# Patient Record
Sex: MALE | Race: WHITE | Employment: UNEMPLOYED | URBAN - METROPOLITAN AREA
[De-identification: names, ages, dates, MRNs, and addresses within clinical notes are randomized per-mention and may not be internally consistent; named-entity substitution may affect disease eponyms.]

---

## 2017-07-31 ENCOUNTER — HOSPITAL ENCOUNTER (OUTPATIENT)
Age: 82
Setting detail: SPECIMEN
Discharge: HOME | End: 2017-07-31
Payer: MEDICARE

## 2017-08-02 LAB — DERMATOLOGY PATHOLOGY REPORT: NORMAL

## 2017-10-01 ENCOUNTER — APPOINTMENT (OUTPATIENT)
Dept: CT IMAGING | Age: 82
End: 2017-10-01
Payer: MEDICARE

## 2017-10-01 ENCOUNTER — HOSPITAL ENCOUNTER (OUTPATIENT)
Age: 82
Setting detail: OBSERVATION
Discharge: HOME | End: 2017-10-04
Attending: EMERGENCY MEDICINE | Admitting: INTERNAL MEDICINE
Payer: MEDICARE

## 2017-10-01 ENCOUNTER — APPOINTMENT (OUTPATIENT)
Dept: GENERAL RADIOLOGY | Age: 82
End: 2017-10-01
Payer: MEDICARE

## 2017-10-01 DIAGNOSIS — N30.00 ACUTE CYSTITIS WITHOUT HEMATURIA: Primary | ICD-10-CM

## 2017-10-01 LAB
-: ABNORMAL
ABSOLUTE EOS #: 0.1 K/UL (ref 0–0.4)
ABSOLUTE LYMPH #: 0.7 K/UL (ref 1–4.8)
ABSOLUTE MONO #: 0.5 K/UL (ref 0.2–0.8)
ALBUMIN SERPL-MCNC: 3.6 G/DL (ref 3.5–5.2)
ALBUMIN/GLOBULIN RATIO: ABNORMAL (ref 1–2.5)
ALP BLD-CCNC: 230 U/L (ref 40–129)
ALT SERPL-CCNC: 57 U/L (ref 5–41)
AMORPHOUS: ABNORMAL
AMYLASE: 52 U/L (ref 28–100)
ANION GAP SERPL CALCULATED.3IONS-SCNC: 16 MMOL/L (ref 9–17)
AST SERPL-CCNC: 142 U/L
BACTERIA: ABNORMAL
BASOPHILS # BLD: 1 %
BASOPHILS ABSOLUTE: 0.1 K/UL (ref 0–0.2)
BILIRUB SERPL-MCNC: 1.41 MG/DL (ref 0.3–1.2)
BILIRUBIN DIRECT: 0.93 MG/DL
BILIRUBIN URINE: NEGATIVE
BILIRUBIN, INDIRECT: 0.48 MG/DL (ref 0–1)
BUN BLDV-MCNC: 21 MG/DL (ref 8–23)
BUN/CREAT BLD: 17 (ref 9–20)
CALCIUM SERPL-MCNC: 9.6 MG/DL (ref 8.6–10.4)
CASTS UA: ABNORMAL /LPF
CHLORIDE BLD-SCNC: 93 MMOL/L (ref 98–107)
CO2: 26 MMOL/L (ref 20–31)
COLOR: YELLOW
COMMENT UA: ABNORMAL
CREAT SERPL-MCNC: 1.21 MG/DL (ref 0.7–1.2)
CRYSTALS, UA: ABNORMAL /HPF
DIFFERENTIAL TYPE: ABNORMAL
EOSINOPHILS RELATIVE PERCENT: 1 %
EPITHELIAL CELLS UA: ABNORMAL /HPF
GFR AFRICAN AMERICAN: >60 ML/MIN
GFR NON-AFRICAN AMERICAN: 57 ML/MIN
GFR SERPL CREATININE-BSD FRML MDRD: ABNORMAL ML/MIN/{1.73_M2}
GFR SERPL CREATININE-BSD FRML MDRD: ABNORMAL ML/MIN/{1.73_M2}
GLOBULIN: ABNORMAL G/DL (ref 1.5–3.8)
GLUCOSE BLD-MCNC: 141 MG/DL (ref 70–99)
GLUCOSE URINE: NEGATIVE
HCT VFR BLD CALC: 37.2 % (ref 41–53)
HEMOGLOBIN: 12.2 G/DL (ref 13.5–17.5)
KETONES, URINE: NEGATIVE
LEUKOCYTE ESTERASE, URINE: ABNORMAL
LIPASE: 27 U/L (ref 13–60)
LYMPHOCYTES # BLD: 8 %
MCH RBC QN AUTO: 26 PG (ref 26–34)
MCHC RBC AUTO-ENTMCNC: 32.8 G/DL (ref 31–37)
MCV RBC AUTO: 79.5 FL (ref 80–100)
MONOCYTES # BLD: 6 %
MUCUS: ABNORMAL
MYOGLOBIN: 28 NG/ML (ref 28–72)
NITRITE, URINE: NEGATIVE
OTHER OBSERVATIONS UA: ABNORMAL
PDW BLD-RTO: 15.5 % (ref 11.5–14.5)
PH UA: 7.5 (ref 5–8)
PLATELET # BLD: 222 K/UL (ref 130–400)
PLATELET ESTIMATE: ABNORMAL
PMV BLD AUTO: ABNORMAL FL (ref 6–12)
POTASSIUM SERPL-SCNC: 4.2 MMOL/L (ref 3.7–5.3)
PROTEIN UA: NEGATIVE
RBC # BLD: 4.68 M/UL (ref 4.5–5.9)
RBC # BLD: ABNORMAL 10*6/UL
RBC UA: ABNORMAL /HPF (ref 0–2)
RENAL EPITHELIAL, UA: ABNORMAL /HPF
SEG NEUTROPHILS: 84 %
SEGMENTED NEUTROPHILS ABSOLUTE COUNT: 7.2 K/UL (ref 1.8–7.7)
SODIUM BLD-SCNC: 135 MMOL/L (ref 135–144)
SPECIFIC GRAVITY UA: 1.01 (ref 1–1.03)
TOTAL PROTEIN: 7.9 G/DL (ref 6.4–8.3)
TRICHOMONAS: ABNORMAL
TROPONIN INTERP: NORMAL
TROPONIN T: <0.03 NG/ML
TURBIDITY: ABNORMAL
URINE HGB: ABNORMAL
UROBILINOGEN, URINE: ABNORMAL
WBC # BLD: 8.6 K/UL (ref 3.5–11)
WBC # BLD: ABNORMAL 10*3/UL
WBC UA: ABNORMAL /HPF (ref 0–5)
YEAST: ABNORMAL

## 2017-10-01 PROCEDURE — 87186 SC STD MICRODIL/AGAR DIL: CPT

## 2017-10-01 PROCEDURE — 87086 URINE CULTURE/COLONY COUNT: CPT

## 2017-10-01 PROCEDURE — 81001 URINALYSIS AUTO W/SCOPE: CPT

## 2017-10-01 PROCEDURE — 80076 HEPATIC FUNCTION PANEL: CPT

## 2017-10-01 PROCEDURE — 85025 COMPLETE CBC W/AUTO DIFF WBC: CPT

## 2017-10-01 PROCEDURE — 82150 ASSAY OF AMYLASE: CPT

## 2017-10-01 PROCEDURE — 6360000002 HC RX W HCPCS: Performed by: NURSE PRACTITIONER

## 2017-10-01 PROCEDURE — 83690 ASSAY OF LIPASE: CPT

## 2017-10-01 PROCEDURE — G0378 HOSPITAL OBSERVATION PER HR: HCPCS

## 2017-10-01 PROCEDURE — 74177 CT ABD & PELVIS W/CONTRAST: CPT

## 2017-10-01 PROCEDURE — 6370000000 HC RX 637 (ALT 250 FOR IP): Performed by: INTERNAL MEDICINE

## 2017-10-01 PROCEDURE — 96365 THER/PROPH/DIAG IV INF INIT: CPT

## 2017-10-01 PROCEDURE — 87077 CULTURE AEROBIC IDENTIFY: CPT

## 2017-10-01 PROCEDURE — 93005 ELECTROCARDIOGRAM TRACING: CPT

## 2017-10-01 PROCEDURE — 74022 RADEX COMPL AQT ABD SERIES: CPT

## 2017-10-01 PROCEDURE — 2580000003 HC RX 258: Performed by: NURSE PRACTITIONER

## 2017-10-01 PROCEDURE — 80048 BASIC METABOLIC PNL TOTAL CA: CPT

## 2017-10-01 PROCEDURE — 2580000003 HC RX 258: Performed by: INTERNAL MEDICINE

## 2017-10-01 PROCEDURE — 99285 EMERGENCY DEPT VISIT HI MDM: CPT

## 2017-10-01 PROCEDURE — 83874 ASSAY OF MYOGLOBIN: CPT

## 2017-10-01 PROCEDURE — 6360000004 HC RX CONTRAST MEDICATION: Performed by: NURSE PRACTITIONER

## 2017-10-01 PROCEDURE — 84484 ASSAY OF TROPONIN QUANT: CPT

## 2017-10-01 PROCEDURE — 96361 HYDRATE IV INFUSION ADD-ON: CPT

## 2017-10-01 RX ORDER — SODIUM CHLORIDE 0.9 % (FLUSH) 0.9 %
10 SYRINGE (ML) INJECTION PRN
Status: DISCONTINUED | OUTPATIENT
Start: 2017-10-01 | End: 2017-10-04 | Stop reason: HOSPADM

## 2017-10-01 RX ORDER — OXYCODONE HYDROCHLORIDE 15 MG/1
15-30 TABLET ORAL
COMMUNITY

## 2017-10-01 RX ORDER — 0.9 % SODIUM CHLORIDE 0.9 %
50 INTRAVENOUS SOLUTION INTRAVENOUS ONCE
Status: COMPLETED | OUTPATIENT
Start: 2017-10-01 | End: 2017-10-01

## 2017-10-01 RX ORDER — SODIUM CHLORIDE 0.9 % (FLUSH) 0.9 %
10 SYRINGE (ML) INJECTION EVERY 12 HOURS SCHEDULED
Status: DISCONTINUED | OUTPATIENT
Start: 2017-10-01 | End: 2017-10-04 | Stop reason: HOSPADM

## 2017-10-01 RX ORDER — OXYCODONE HYDROCHLORIDE 15 MG/1
15 TABLET ORAL EVERY 4 HOURS PRN
Status: DISCONTINUED | OUTPATIENT
Start: 2017-10-01 | End: 2017-10-03

## 2017-10-01 RX ORDER — DOCUSATE SODIUM 100 MG/1
100 CAPSULE, LIQUID FILLED ORAL NIGHTLY
Status: ON HOLD | COMMUNITY
End: 2017-10-04

## 2017-10-01 RX ORDER — METOPROLOL TARTRATE 50 MG/1
50 TABLET, FILM COATED ORAL 2 TIMES DAILY
Status: DISCONTINUED | OUTPATIENT
Start: 2017-10-01 | End: 2017-10-02

## 2017-10-01 RX ADMIN — OXYCODONE HYDROCHLORIDE 15 MG: 15 TABLET ORAL at 21:39

## 2017-10-01 RX ADMIN — IOPAMIDOL 125 ML: 755 INJECTION, SOLUTION INTRAVENOUS at 15:52

## 2017-10-01 RX ADMIN — METOPROLOL TARTRATE 50 MG: 50 TABLET ORAL at 21:39

## 2017-10-01 RX ADMIN — Medication 10 ML: at 15:52

## 2017-10-01 RX ADMIN — CEFTRIAXONE SODIUM 1 G: 1 INJECTION, POWDER, FOR SOLUTION INTRAMUSCULAR; INTRAVENOUS at 16:31

## 2017-10-01 RX ADMIN — SODIUM CHLORIDE 50 ML: 9 INJECTION, SOLUTION INTRAVENOUS at 15:52

## 2017-10-01 RX ADMIN — Medication 10 ML: at 21:45

## 2017-10-01 ASSESSMENT — ENCOUNTER SYMPTOMS
COLOR CHANGE: 0
CONSTIPATION: 0
VOMITING: 0
DIARRHEA: 0
WHEEZING: 0
COUGH: 0
SORE THROAT: 0
RHINORRHEA: 0
ABDOMINAL PAIN: 0
SHORTNESS OF BREATH: 0
SINUS PRESSURE: 0
NAUSEA: 0

## 2017-10-01 ASSESSMENT — PAIN SCALES - GENERAL
PAINLEVEL_OUTOF10: 8
PAINLEVEL_OUTOF10: 0

## 2017-10-01 ASSESSMENT — PAIN DESCRIPTION - LOCATION
LOCATION: ABDOMEN

## 2017-10-01 ASSESSMENT — PAIN DESCRIPTION - DESCRIPTORS
DESCRIPTORS: ACHING

## 2017-10-01 ASSESSMENT — PAIN DESCRIPTION - PAIN TYPE
TYPE: CHRONIC PAIN;ACUTE PAIN
TYPE: ACUTE PAIN
TYPE: ACUTE PAIN

## 2017-10-01 NOTE — IP AVS SNAPSHOT
Patient Information     Patient Name DORENE Maguire 1934         This is your updated medication list to keep with you all times      TAKE these medications     amiodarone 200 MG tablet   Commonly known as:  CORDARONE   Take 1 tablet by mouth daily       aspirin 81 MG tablet       cephALEXin 500 MG capsule   Commonly known as:  KEFLEX   Take 1 capsule by mouth 3 times daily for 10 days       docusate sodium 100 MG capsule   Commonly known as:  COLACE   Take 1 capsule by mouth 2 times daily       furosemide 40 MG tablet   Commonly known as:  LASIX   Take 1 tablet by mouth daily       lisinopril 2.5 MG tablet   Commonly known as:  ZESTRIL   Take 1 tablet by mouth daily       metoprolol tartrate 25 MG tablet   Commonly known as:  LOPRESSOR       oxyCODONE 15 MG immediate release tablet   Commonly known as:  OXY-IR       senna 8.6 MG tablet   Commonly known as:  SENOKOT   Take 1 tablet by mouth 2 times daily       tamsulosin 0.4 MG capsule   Commonly known as:  FLOMAX         ASK your doctor about these medications     clopidogrel 75 MG tablet   Commonly known as:  PLAVIX   Take 1 tablet by mouth daily       pitavastatin 2 MG Tabs tablet   Commonly known as:  LIVALO

## 2017-10-01 NOTE — IP AVS SNAPSHOT
After Visit Summary  (Discharge Instructions)    Medication List for Home    Based on the information you provided to us as well as any changes during this visit, the following is your updated medication list.  Compare this with your prescription bottles at home. If you have any questions or concerns, contact your primary care physician's office. Daily Medication List (This medication list can be shared with any healthcare provider who is helping you manage your medications)      There are NEW medications for you. START taking them after you leave the hospital        Last Dose    Next Dose Due AM NOON PM NIGHT    cephALEXin 500 MG capsule   Commonly known as:  KEFLEX   Take 1 capsule by mouth 3 times daily for 10 days                                         senna 8.6 MG tablet   Commonly known as:  SENOKOT   Take 1 tablet by mouth 2 times daily                                           You told us you were taking these medications at home, but the amount or how often you take this medication has CHANGED        Last Dose    Next Dose Due AM NOON PM NIGHT    amiodarone 200 MG tablet   Commonly known as:  CORDARONE   Take 1 tablet by mouth daily   What changed:    - medication strength  - Another medication with the same name was removed. Continue taking this medication, and follow the directions you see here.                 200 mg on 10/4/2017  8:53 AM                            docusate sodium 100 MG capsule   Commonly known as:  COLACE   Take 1 capsule by mouth 2 times daily   What changed:  when to take this                100 mg on 10/3/2017 10:40 PM                            lisinopril 2.5 MG tablet   Commonly known as:  ZESTRIL   Take 1 tablet by mouth daily   What changed:    - medication strength  - how much to take                                           These are medications you told us you were taking at home, CONTINUE taking them after you leave the hospital        Last Dose Clonidine Derivatives Other (See Comments)    hallucinations    Digoxin And Related     hallucinations      Immunizations as of 10/4/2017     Name Date Dose VIS Date Route    Influenza, Quadv, 3 yrs and older, IM, Preservative Free  Deferred () 0.5 mL 2015 Intramuscular    Comment: pt wants to check with hem/oc first    Pneumococcal 13-valent Conjugate ([id number])  Deferred () 0.5 mL 2015 Intramuscular    Comment: pt wants to check with hem/oc. Last Vitals          Most Recent Value    Temp  98.2 °F (36.8 °C)    Pulse  67    Resp  16    BP  (!)  133/51         After Visit Summary    This summary was created for you. Thank you for entrusting your care to us. The following information includes details about your hospital/visit stay along with steps you should take to help with your recovery once you leave the hospital.  In this packet, you will find information about the topics listed below:    · Instructions about your medications including a list of your home medications  · A summary of your hospital visit  · Follow-up appointments once you have left the hospital  · Your care plan at home      You may receive a survey regarding the care you received during your stay. Your input is valuable to us. We encourage you to complete and return your survey in the envelope provided. We hope you will choose us in the future for your healthcare needs. Patient Information     Patient Name DORENE Spring 1934      Care Provided at:     Name Address Phone       [place] [address] [phone]            Your Visit    Here you will find information about your visit, including the reason for your visit. Please take this sheet with you when you visit your doctor or other health care provider in the future. It will help determine the best possible medical care for you at that time.   If you have any questions the hospital.  Your care team will discuss these with you, so you and those caring for you know how to best care for your health needs at home. This section may also include educational information about certain health topics that may be of help to you. Discharge Instructions       Drink plenty of water  For any problems call your doctor or return to the emergency room  Notify MD if temperature, unable to urinate, bloody urine, confusion or weakness    Diet Instructions     ? Good nutrition is important when healing from an illness, injury, or surgery. Follow any nutrition recommendations given to you during your hospital stay. ? If you were given an oral nutrition supplement while in the hospital, continue to take this supplement at home. You can take it with meals, in-between meals, and/or before bedtime. These supplements can be purchased at most local grocery stores, pharmacies, and [employer]-stores. ? If you have any questions about your diet or nutrition, call the hospital and ask for the dietitian. Regular diet Drink plenty of fluids           Activity Instructions     No activity restrictions Up with cane           Important information for a smoker       SMOKING: QUIT SMOKING. THIS IS THE MOST IMPORTANT ACTION YOU CAN TAKE TO IMPROVE YOUR CURRENT AND FUTURE HEALTH. Call the [address] [place] [address] at [place] NOW ([phone])    Smoking harms nonsmokers. When nonsmokers are around people who smoke, they absorb nicotine, carbon monoxide, and other ingredients of tobacco smoke. DO NOT SMOKE AROUND CHILDREN     Children exposed to secondhand smoke are at an increased risk of:  Sudden Infant Death Syndrome (SIDS), acute respiratory infections, inflammation of the middle ear, and severe asthma. Over a longer time, it causes heart disease and lung cancer. There is no safe level of exposure to secondhand smoke.                 [facility] Bringrr allows you to send messages to your doctor, view your test results, renew your prescriptions, schedule appointments, view visit notes, and more. How Do I Sign Up? 1. In your Internet browser, go to [email or web address]  2. Click on the Sign Up Now link in the Sign In box. You will see the New Member Sign Up page. 3. Enter your Bringrr Access Code exactly as it appears below. You will not need to use this code after youve completed the sign-up process. If you do not sign up before the expiration date, you must request a new code. Bringrr Access Code: [email or web address]  Expires: 12/2/2017  6:23 PM    4. Enter your Social Security Number ([id number]) and Date of Birth (mm/dd/yyyy) as indicated and click Submit. You will be taken to the next sign-up page. 5. Create a Bringrr ID. This will be your Bringrr login ID and cannot be changed, so think of one that is secure and easy to remember. 6. Create a Bringrr password. You can change your password at any time. 7. Enter your Password Reset Question and Answer. This can be used at a later time if you forget your password. 8. Enter your e-mail address. You will receive e-mail notification when new information is available in [address] 9. Click Sign Up. You can now view your medical record. Additional Information  If you have questions, please contact the physician practice where you receive care. Remember, Bringrr is NOT to be used for urgent needs. For medical emergencies, dial 911. For questions regarding your Bringrr account call [phone]. If you have a clinical question, please call your doctor's office. View your information online  ? Review your current list of  medications, immunization, and allergies. ? Review your future test results online . ?  Review your discharge instructions provided by your caregivers at discharge    Certain functionality such as prescription refills, scheduling appointments or sending messages to your provider are not activated if your provider does not use [employer] in his/her office    For questions regarding your [employer] account call [phone]. If you have a clinical question, please call your doctor's office. The information on all pages of the After Visit Summary has been reviewed with me, the patient and/or responsible adult, by my health care provider(s). I had the opportunity to ask questions regarding this information. I understand I should dispose of my armband safely at home to protect my health information. A complete copy of the After Visit Summary has been given to me, the patient and/or responsible adult. Patient Signature/Responsible Adult:____________________    Clinician Signature:_____________________    Date:_____________________    Time:_____________________        More Information           Urinary Tract Infections in Men: Care Instructions  Your Care Instructions    A urinary tract infection, or UTI, is a general term for an infection anywhere between the kidneys and the tip of the penis. UTIs can also be a result of a prostate problem. Most cause pain or burning when you urinate. Most UTIs are caused by bacteria and can be cured with antibiotics. It is important to complete your treatment so that the infection does not get worse. Follow-up care is a key part of your treatment and safety. Be sure to make and go to all appointments, and call your doctor if you are having problems. It's also a good idea to know your test results and keep a list of the medicines you take. How can you care for yourself at home? · Take your antibiotics as prescribed. Do not stop taking them just because you feel better. You need to take the full course of antibiotics. · Take your medicines exactly as prescribed.  Your doctor may have prescribed a medicine, such as phenazopyridine (Pyridium), to help relieve pain when you urinate. This turns your urine orange. You may stop taking it when your symptoms get better. But be sure to take all of your antibiotics, which treat the infection. · Drink extra water for the next day or two. This will help make the urine less concentrated and help wash out the bacteria causing the infection. (If you have kidney, heart, or liver disease and have to limit your fluids, talk with your doctor before you increase your fluid intake.)  · Avoid drinks that are carbonated or have caffeine. They can irritate the bladder. · Urinate often. Try to empty your bladder each time. · To relieve pain, take a hot bath or lay a heating pad (set on low) over your lower belly or genital area. Never go to sleep with a heating pad in place. To help prevent UTIs  · Drink plenty of fluids, enough so that your urine is light yellow or clear like water. If you have kidney, heart, or liver disease and have to limit fluids, talk with your doctor before you increase the amount of fluids you drink. · Urinate when you have the urge. Do not hold your urine for a long time. Urinate before you go to sleep. · Keep your penis clean. Catheter care  If you have a drainage tube (catheter) in place, the following steps will help you care for it. · Always wash your hands before and after touching your catheter. · Check the area around the urethra for inflammation or signs of infection. Signs of infection include irritated, swollen, red, or tender skin, or pus around the catheter. · Clean the area around the catheter with soap and water two times a day. Dry with a clean towel afterward. · Do not apply powder or lotion to the skin around the catheter. To empty the urine collection bag  · Wash your hands with soap and water. · Without touching the drain spout, remove the spout from its sleeve at the bottom of the collection bag. Open the valve on the spout. Other things that may put you at risk for COPD include breathing chemical fumes, dust, or air pollution over a long period of time. Secondhand smoke is also bad. In chronic bronchitis, the airways that carry air to the lungs (bronchial tubes) get inflamed and make a lot of mucus. This can narrow or block the airways, making it hard for you to breathe. In emphysema, the air sacs in your lungs are damaged and lose their stretch. Less air gets in and out of your lungs, which makes you feel short of breath. What can you expect when you have COPD? COPD gets worse over time. You cannot undo the damage to your lungs. Over time, you may find that:  · You get short of breath even when you do simple things like get dressed or fix a meal.  · It is hard to eat or exercise. · You lose weight and feel weaker. But there are things you can do to prevent more damage and feel better. What are the symptoms? The main symptoms are:  · A cough that will not go away. · Mucus that comes up when you cough. · Shortness of breath that gets worse with activity. At times, your symptoms may suddenly flare up and get much worse. This is a called a COPD exacerbation (say \"[facility]\"). When this happens, your usual symptoms quickly get worse and stay bad. This can be dangerous. You may have to go to the hospital.  How can you keep COPD from getting worse? Don't smoke. That is the best way to keep COPD from getting worse. If you already smoke, it is never too late to stop. If you need help quitting, talk to your doctor about stop-smoking programs and medicines. These can increase your chances of quitting for good. You can do other things to keep COPD from getting worse:  · Avoid bad air. Air pollution, chemical fumes, and dust also can make COPD worse. · Get a flu shot every year. A shot may keep the flu from turning into something more serious, like pneumonia.  A flu shot also may lower your · Eat regular, healthy meals. Use bronchodilators about 1 hour before you eat to make it easier to eat. Eat several small meals instead of three large ones. Drink beverages at the end of the meal. Avoid foods that are hard to chew. Follow-up care is a key part of your treatment and safety. Be sure to make and go to all appointments, and call your doctor if you are having problems. It's also a good idea to know your test results and keep a list of the medicines you take. Where can you learn more? Go to [email or web address]://[email or web address] org and sign in to your [employer] account. Enter V314 in the hulu box to learn more about \"Learning About COPD. \"     If you do not have an account, please click on the \"Sign Up Now\" link. Current as of: March 25, 2017  Content Version: 11.3  © 3039-0112 [employer]. Care instructions adapted under license by [facility] ([facility]). If you have questions about a medical condition or this instruction, always ask your healthcare professional. [address] any warranty or liability for your use of this information. Learning About Heart Failure Zones  What are heart failure zones? Heart failure zones give you an easy way to see changes in your heart failure symptoms. They also tell you when you need to get help. Check every day to see which zone you are in. Green zone. You are doing well. This is where you want to be. · Your weight is stable. This means it is not going up or down. · You breathe easily. · You are sleeping well. You are able to lie flat without shortness of breath. · You can do your usual activities. Yellow zone. Be careful. Your symptoms are changing. Call your doctor. · You have new or increased shortness of breath. · You are dizzy or lightheaded, or you feel like you may faint.   · You have sudden weight gain, such as more than 2 to 3 pounds in a day or

## 2017-10-01 NOTE — IP AVS SNAPSHOT
Patient Information     Patient Name DORENE Mcgarry 1934      Important Information for Heart Failure       If your condition worsens or if you have any concerns, call your doctor or seek emergency medical services (dial 9-1-1) as needed. If you have any of the following symptoms/conditions, call your doctor. Call your primary care physician to obtain results of outstanding lab tests, cultures, x-rays, or other tests. If you have a current diagnosis or history of any of the following, please review the information carefully. HEART FAILURE PATIENTS:   DISCHARGE WEIGHT: Weight: 130 lb (59 kg)  Record daily weights. Notify your doctor if you have a weight gain 2 pounds in a day, or 3-5 pounds in 1 week. Notify your doctor for increased shortness of breath, or swelling in legs or feet. Follow a low sodium diet. Resume normal activity unless otherwise instructed by your doctor.

## 2017-10-01 NOTE — ED PROVIDER NOTES
tablet, Refills: 0    Comments: Dr. Marilee Kc changed dosage on prescription that was sent with patient. pitavastatin (LIVALO) 2 MG TABS tablet Take 2 mg by mouth nightly      tamsulosin (FLOMAX) 0.4 MG capsule Take 0.4 mg by mouth daily      aspirin 81 MG tablet Take 81 mg by mouth daily      clopidogrel (PLAVIX) 75 MG tablet Take 1 tablet by mouth daily  Qty: 30 tablet, Refills: 0      lisinopril (PRINIVIL;ZESTRIL) 10 MG tablet Take 10 mg by mouth daily             PAST MEDICAL HISTORY         Diagnosis Date    Cancer ([facility]     PROSTATE( radioactive seeds), SIGMOIDx2 yrs no chemo or radiation    Congestive heart failure (CHF) (HCC)     [facility]     patient unaware    History of atrial fibrillation 9-    Hyperlipidemia     Hypertension     Obstructive sleep apnea     no machine       SURGICAL HISTORY           Procedure Laterality Date    CYSTOSCOPY  11-20-15    with stent removal and re-insertion    SIGMOIDECTOMY  9-2015    SIGMOID RESECTION FOR CARCINOMA         FAMILY HISTORY     History reviewed. No pertinent family history. No family status information on file. SOCIAL HISTORY      reports that he quit smoking about 52 years ago. He has never used smokeless tobacco. He reports that he does not drink alcohol. REVIEW OF SYSTEMS    (2-9 systems for level 4, 10 or more for level 5)     Review of Systems   Constitutional: Negative for chills, fever and unexpected weight change. HENT: Negative for congestion, rhinorrhea, sinus pressure and sore throat. Respiratory: Negative for cough, shortness of breath and wheezing. Cardiovascular: Negative for chest pain and palpitations. Gastrointestinal: Negative for abdominal pain, constipation, diarrhea, nausea and vomiting. Genitourinary: Negative for dysuria and hematuria. Musculoskeletal: Negative for arthralgias and myalgias. Skin: Negative for color change and rash.    Neurological: Negative for dizziness, weakness and headaches. Hematological: Negative for adenopathy. Except as noted above the remainder of the review of systems was reviewed and negative. PHYSICAL EXAM    (up to 7 for level 4, 8 or more for level 5)   ED Triage Vitals   BP Temp Temp src Pulse Resp SpO2 Height Weight   10/01/17 1422 10/01/17 1422 -- 10/01/17 1422 10/01/17 1422 10/01/17 1422 10/01/17 1422 10/01/17 1422   176/67 97.4 °F (36.3 °C)  73 16 100 % 5' 8.5\" (1.74 m) 130 lb (59 kg)       Physical Exam   Constitutional: He is oriented to person, place, and time. He appears well-developed and well-nourished. HENT:   Head: Normocephalic and atraumatic. Mouth/Throat: Oropharynx is clear and moist.   Eyes: Conjunctivae are normal. Pupils are equal, round, and reactive to light. Neck: Normal range of motion. Neck supple. Cardiovascular: Normal rate and regular rhythm. Pulmonary/Chest: Effort normal and breath sounds normal. No stridor. No respiratory distress. Abdominal: Soft. Bowel sounds are normal.   Musculoskeletal: Normal range of motion. Lymphadenopathy:     He has no cervical adenopathy. Neurological: He is alert and oriented to person, place, and time. Skin: Skin is warm and dry. No rash noted. Psychiatric: He has a normal mood and affect. Vitals reviewed. RADIOLOGY:   Non-plain film images such as CT, Ultrasound and MRI are read by the radiologist. Ngozi Filter radiographic images are visualized and preliminarily interpreted by the emergency physician with the below findings:    Xr Acute Abd Series Chest 1 Vw    Result Date: 10/1/2017  EXAMINATION: ACUTE ABDOMINAL SERIES 10/1/2017 3:02 pm COMPARISON: None. HISTORY: ORDERING SYSTEM PROVIDED HISTORY: Pain TECHNOLOGIST PROVIDED HISTORY: Reason for exam:->Pain Ordering Physician Provided Reason for Exam: Pt c/o generalized abd pain. No known injury.  Acuity: Acute Type of Exam: Initial FINDINGS: There is a left subclavian catheter terminating superior vena cava. Multiple surgical clips noted in the lower abdomen. Metallic prostatic seeds are noted. Lungs are clear without acute process. No pleural effusion or pneumothorax. No focal consolidation or edema. Cardiomediastinal silhouette and bony thorax are without acute abnormality. No evidence of intraperitoneal free air. Nonspecific bowel gas pattern without evidence of obstruction. No abnormal calcifications. Osseous structures are intact. No acute process in the chest. No bowel obstruction or free air. Ct Abdomen Pelvis W Iv Contrast    Result Date: 10/1/2017  EXAMINATION: CT OF THE ABDOMEN AND PELVIS WITH CONTRAST 10/1/2017 3:59 pm TECHNIQUE: CT of the abdomen and pelvis was performed with the administration of intravenous contrast. Multiplanar reformatted images are provided for review. Dose modulation, iterative reconstruction, and/or weight based adjustment of the mA/kV was utilized to reduce the radiation dose to as low as reasonably achievable. COMPARISON: August 12th 2009 HISTORY: ORDERING SYSTEM PROVIDED HISTORY: abdominal pain TECHNOLOGIST PROVIDED HISTORY: IV Only Contrast FINDINGS: Lower Chest: There is a 6 mm nodule at the left lung base. Another 6 mm nodule appears to be present at the right lung base. There is calcific coronary artery disease. Organs: There is a right inguinal hernia containing a loop of bowel without evidence of inflammation or obstruction. Multiple ill-defined hypodensities are noted throughout the liver consistent with metastatic disease. The gallbladder appears normal.  The spleen and pancreas are normal.  The adrenals are normal. The kidneys appear normal bilaterally. The bladder is normal. GI/Bowel: There is moderate stool throughout the colon. Anastomotic sutures are noted in the proximal small bowel. The appendix is not identified. The small bowel otherwise appears normal.  The stomach appears normal. Pelvis: Prostatic seeds are noted. Peritoneum/Retroperitoneum: There is calcified plaque along the aorta and its branches. The retroperitoneum is otherwise unremarkable. Multiple surgical clips are noted anterior to the common femoral artery on the left. Bones/Soft Tissues: There is multilevel vacuum disc phenomenon and spondylosis. There is diffuse demineralization especially at L4-5. Hepatic metastases. Bibasilar pulmonary nodules. Recommend follow-up CT chest with IV contrast non emergently. Other incidental findings as above     Interpretation per the Radiologist below, if available at the time of this note:    CT ABDOMEN PELVIS W IV CONTRAST   Final Result   Hepatic metastases. Bibasilar pulmonary nodules. Recommend follow-up CT chest with IV contrast   non emergently. Other incidental findings as above         XR Acute Abd Series Chest 1 VW   Final Result   No acute process in the chest.      No bowel obstruction or free air.                  LABS:  Labs Reviewed   CBC WITH AUTO DIFFERENTIAL - Abnormal; Notable for the following:        Result Value    Hemoglobin 12.2 (*)     Hematocrit 37.2 (*)     MCV 79.5 (*)     RDW 15.5 (*)     Absolute Lymph # 0.70 (*)     All other components within normal limits   BASIC METABOLIC PANEL - Abnormal; Notable for the following:     Glucose 141 (*)     CREATININE 1.21 (*)     Chloride 93 (*)     GFR Non- 57 (*)     All other components within normal limits   HEPATIC FUNCTION PANEL - Abnormal; Notable for the following:     Alkaline Phosphatase 230 (*)     ALT 57 (*)      (*)     Total Bilirubin 1.41 (*)     Bilirubin, Direct 0.93 (*)     All other components within normal limits   UA W/REFLEX CULTURE - Abnormal; Notable for the following:     Turbidity UA CLOUDY (*)     Urine Hgb TRACE (*)     Urobilinogen, Urine ELEVATED (*)     Leukocyte Esterase, Urine LARGE (*)     All other components within normal limits   MICROSCOPIC URINALYSIS - Abnormal; Notable for the following:     Bacteria, UA MANY (*)     All other components within normal limits   URINE CULTURE CLEAN CATCH   TROP/MYOGLOBIN   LIPASE   AMYLASE       All other labs were within normal range or not returned as of this dictation. EMERGENCY DEPARTMENT COURSE and DIFFERENTIAL DIAGNOSIS/MDM:   Vitals:    Vitals:    10/01/17 1422 10/01/17 1802 10/01/17 1830   BP: (!) 176/67 (!) 164/62 (!) 177/74   Pulse: 73 76 76   Resp: 16 16 16   Temp: 97.4 °F (36.3 °C)  98.7 °F (37.1 °C)   TempSrc:   Oral   SpO2: 100% 98% 99%   Weight: 130 lb (59 kg)     Height: 5' 8.5\" (1.74 m)         Medical Decision Making: Immediately was found to have a urinary tract infection. He is complaining of weakness and fatigue. His urinary tract infection is significantly also has metastatic liver lesions. He'll be ADMITTED TO THE hospital for further evaluation   Medications   sodium chloride flush 0.9 % injection 10 mL (10 mLs Intravenous Given 10/1/17 1552)   sodium chloride flush 0.9 % injection 10 mL (not administered)   sodium chloride flush 0.9 % injection 10 mL (not administered)   cefTRIAXone (ROCEPHIN) 1 g IVPB in 50 mL D5W minibag (0 g Intravenous Stopped 10/1/17 1701)   0.9 % sodium chloride bolus (0 mLs Intravenous Stopped 10/1/17 1559)   iopamidol (ISOVUE-370) 76 % injection 125 mL (125 mLs Intravenous Given 10/1/17 1552)       CONSULTS:  IP CONSULT TO HOSPITALIST        FINAL IMPRESSION      1.  Acute cystitis without hematuria          DISPOSITION/PLAN   DISPOSITION     PATIENT REFERRED TO:   Kevin Tai MD  [address] [facility] [phone]            DISCHARGE MEDICATIONS:     Current Discharge Medication List              (Please note that portions of this note were completed with a voice recognition program.  Efforts were made to edit the dictations but occasionally words are mis-transcribed.)    [address] [facility] NP, CNP  Certified Nurse Practitioner            Ferny Jimenez CNP  10/01/17

## 2017-10-02 LAB
ABSOLUTE EOS #: 0 K/UL (ref 0–0.4)
ABSOLUTE LYMPH #: 1 K/UL (ref 1–4.8)
ABSOLUTE MONO #: 0.9 K/UL (ref 0.2–0.8)
ANION GAP SERPL CALCULATED.3IONS-SCNC: 13 MMOL/L (ref 9–17)
BASOPHILS # BLD: 0 %
BASOPHILS ABSOLUTE: 0 K/UL (ref 0–0.2)
BUN BLDV-MCNC: 18 MG/DL (ref 8–23)
BUN/CREAT BLD: 17 (ref 9–20)
CALCIUM SERPL-MCNC: 9.9 MG/DL (ref 8.6–10.4)
CHLORIDE BLD-SCNC: 93 MMOL/L (ref 98–107)
CO2: 29 MMOL/L (ref 20–31)
CREAT SERPL-MCNC: 1.08 MG/DL (ref 0.7–1.2)
DIFFERENTIAL TYPE: ABNORMAL
EOSINOPHILS RELATIVE PERCENT: 0 %
GFR AFRICAN AMERICAN: >60 ML/MIN
GFR NON-AFRICAN AMERICAN: >60 ML/MIN
GFR SERPL CREATININE-BSD FRML MDRD: ABNORMAL ML/MIN/{1.73_M2}
GFR SERPL CREATININE-BSD FRML MDRD: ABNORMAL ML/MIN/{1.73_M2}
GLUCOSE BLD-MCNC: 113 MG/DL (ref 70–99)
HCT VFR BLD CALC: 38.5 % (ref 41–53)
HEMOGLOBIN: 12.5 G/DL (ref 13.5–17.5)
LYMPHOCYTES # BLD: 8 %
MCH RBC QN AUTO: 26.6 PG (ref 26–34)
MCHC RBC AUTO-ENTMCNC: 32.6 G/DL (ref 31–37)
MCV RBC AUTO: 81.7 FL (ref 80–100)
MONOCYTES # BLD: 8 %
PDW BLD-RTO: 16.5 % (ref 11.5–14.5)
PLATELET # BLD: 221 K/UL (ref 130–400)
PLATELET ESTIMATE: ABNORMAL
PMV BLD AUTO: 8.6 FL (ref 6–12)
POTASSIUM SERPL-SCNC: 4.4 MMOL/L (ref 3.7–5.3)
RBC # BLD: 4.71 M/UL (ref 4.5–5.9)
RBC # BLD: ABNORMAL 10*6/UL
SEG NEUTROPHILS: 84 %
SEGMENTED NEUTROPHILS ABSOLUTE COUNT: 9.8 K/UL (ref 1.8–7.7)
SODIUM BLD-SCNC: 135 MMOL/L (ref 135–144)
WBC # BLD: 11.8 K/UL (ref 3.5–11)
WBC # BLD: ABNORMAL 10*3/UL

## 2017-10-02 PROCEDURE — 6360000002 HC RX W HCPCS: Performed by: INTERNAL MEDICINE

## 2017-10-02 PROCEDURE — 96376 TX/PRO/DX INJ SAME DRUG ADON: CPT

## 2017-10-02 PROCEDURE — 2580000003 HC RX 258: Performed by: INTERNAL MEDICINE

## 2017-10-02 PROCEDURE — 85025 COMPLETE CBC W/AUTO DIFF WBC: CPT

## 2017-10-02 PROCEDURE — 6370000000 HC RX 637 (ALT 250 FOR IP): Performed by: INTERNAL MEDICINE

## 2017-10-02 PROCEDURE — 80048 BASIC METABOLIC PNL TOTAL CA: CPT

## 2017-10-02 PROCEDURE — G0378 HOSPITAL OBSERVATION PER HR: HCPCS

## 2017-10-02 PROCEDURE — 36415 COLL VENOUS BLD VENIPUNCTURE: CPT

## 2017-10-02 PROCEDURE — 84134 ASSAY OF PREALBUMIN: CPT

## 2017-10-02 PROCEDURE — 99211 OFF/OP EST MAY X REQ PHY/QHP: CPT

## 2017-10-02 RX ORDER — BISACODYL 10 MG
10 SUPPOSITORY, RECTAL RECTAL DAILY PRN
Status: DISCONTINUED | OUTPATIENT
Start: 2017-10-02 | End: 2017-10-04 | Stop reason: HOSPADM

## 2017-10-02 RX ORDER — METOPROLOL TARTRATE 50 MG/1
50 TABLET, FILM COATED ORAL 2 TIMES DAILY
Status: DISCONTINUED | OUTPATIENT
Start: 2017-10-02 | End: 2017-10-04 | Stop reason: HOSPADM

## 2017-10-02 RX ORDER — TAMSULOSIN HYDROCHLORIDE 0.4 MG/1
0.4 CAPSULE ORAL DAILY
Status: DISCONTINUED | OUTPATIENT
Start: 2017-10-02 | End: 2017-10-03

## 2017-10-02 RX ORDER — AMIODARONE HYDROCHLORIDE 200 MG/1
200 TABLET ORAL DAILY
Status: DISCONTINUED | OUTPATIENT
Start: 2017-10-02 | End: 2017-10-03

## 2017-10-02 RX ORDER — ASPIRIN 81 MG/1
81 TABLET ORAL DAILY
Status: DISCONTINUED | OUTPATIENT
Start: 2017-10-02 | End: 2017-10-03

## 2017-10-02 RX ORDER — DOCUSATE SODIUM 100 MG/1
100 CAPSULE, LIQUID FILLED ORAL NIGHTLY
Status: DISCONTINUED | OUTPATIENT
Start: 2017-10-02 | End: 2017-10-03

## 2017-10-02 RX ORDER — BISACODYL 10 MG
10 SUPPOSITORY, RECTAL RECTAL DAILY
Status: ON HOLD | COMMUNITY
End: 2017-10-03

## 2017-10-02 RX ORDER — OXYCODONE HYDROCHLORIDE 15 MG/1
15 TABLET ORAL EVERY 4 HOURS PRN
Status: DISCONTINUED | OUTPATIENT
Start: 2017-10-02 | End: 2017-10-02

## 2017-10-02 RX ADMIN — METOPROLOL TARTRATE 50 MG: 50 TABLET ORAL at 22:57

## 2017-10-02 RX ADMIN — Medication 10 ML: at 09:48

## 2017-10-02 RX ADMIN — DOCUSATE SODIUM 100 MG: 100 CAPSULE, LIQUID FILLED ORAL at 22:56

## 2017-10-02 RX ADMIN — OXYCODONE HYDROCHLORIDE 15 MG: 15 TABLET ORAL at 14:50

## 2017-10-02 RX ADMIN — CEFTRIAXONE SODIUM 1 G: 1 INJECTION, POWDER, FOR SOLUTION INTRAMUSCULAR; INTRAVENOUS at 15:45

## 2017-10-02 RX ADMIN — METOPROLOL TARTRATE 50 MG: 50 TABLET ORAL at 09:47

## 2017-10-02 RX ADMIN — ASPIRIN 81 MG: 81 TABLET, COATED ORAL at 22:56

## 2017-10-02 RX ADMIN — TAMSULOSIN HYDROCHLORIDE 0.4 MG: 0.4 CAPSULE ORAL at 22:56

## 2017-10-02 ASSESSMENT — PAIN SCALES - GENERAL: PAINLEVEL_OUTOF10: 7

## 2017-10-02 NOTE — FLOWSHEET NOTE
Patient resting in bed; engages in brief conversation; expresses no spiritual/emotional needs at this time; asks  to \"just keep me on your mind. \"    Spiritual Care will follow as needed. 10/02/17 1140   Encounter Summary   Services provided to: Patient   Referral/Consult From: [facility] -  Scientologist   Continue Visiting (10/2/17)   Complexity of Encounter Low   Length of Encounter 15 minutes   Spiritual Assessment Completed Yes   Routine   Type Initial   Assessment Calm; Approachable   Intervention Active listening   Outcome Expressed gratitude

## 2017-10-02 NOTE — CARE COORDINATION
Case Management Initial Discharge Plan  Elias Keller,         Readmission Risk              Readmission Risk:        24.75       Age 72 or Greater:  1    Admitted from SNF or Requires Paid or Family Care:  0    Currently has CHF,COPD,ARF,CRI,or is on dialysis:  4    Takes more than 5 Prescription Medications:  4    Takes Digoxin,Insulin,Anticoagulants,Narcotics or ASA/Plavix:  2    [address] in Past 12 Months:  10    On Disability:  0    Patient Considers own Health:  3.75            Met with:patient or family member patient and son to discuss discharge plans. Information verified: address, contacts, phone number, , insurance Yes  PCP: Dirk Lord MD  Date of last visit: 17    Insurance Provider: Medicare, [facility]    Discharge Planning  Current Residence:   Private residence  Living Arrangements:  Son lives with patient    Home has split level home/4 stairs to climb to bedroom and bathroom  Support Systems:  Children  Current Services PTA:    Supplier: no  Patient able to perform ADL's:Independent  DME used to aid ambulation prior to admission: walker, shower chair, cane/during admission - cane  Potential Assistance Needed:  N/A    Pharmacy: [facility]   Potential Assistance Purchasing Medications:  No  Does patient want to participate in local refill/ meds to beds program?  N/A    Patient agreeable to home care: No  [place] of choice provided:  n/a      Type of Home Care Services:  None  Patient expects to be discharged to:  10/3/17    Prior SNF/Rehab Placement and Facility: [facility]  Agreeable to SNF/Rehab: No  [place] of choice provided: n/a   Evaluation: n/a    Expected Discharge date:  10/03/17  Follow Up Appointment: Best Day/ Time: Monday AM    Transportation provider: myra  Transportation arrangements needed for discharge: No    Discharge Plan: Met with patient and son at bedside. Pt hoping to go home today. Son states pt does well at home.   This is third UTI this year and son thinks its related to chemo. Has been on chemo for about 2 years off and on. Independent at home and uses a cane regularly. Has other DME but not needed. Restarting chemo with Dr. Lorena Tapia in November. On hold due to recent cancerous skin lesion removed by Dr. Tamar Lal. Will make F/U appointment with Dr. Semaj Hazel.          Electronically signed by Amy Bonds RN on 10/2/17 at 8:57 AM

## 2017-10-02 NOTE — PROGRESS NOTES
[facility] Nurse  Consult Note       NAME:  Charlotte Chin  MEDICAL RECORD NUMBER:  [id number]  AGE: 80 y.o. GENDER: male  : 1934  TODAY'S DATE:  10/2/2017    Subjective:     Reason for WOCN Evaluation and Assessment: left upper chest wound. Had a lesion removed per report. ppatient states he uses Aquaphor under dry gauze at home. Charlotte Chin is a 80 y.o. male referred by:   [] Physician  [] Nursing  [] Other:     Patient politely refused care. He states he is leaving tomorrow and his son will re-aaply dressing. Did allow writer to look at wound which is clean and pink without foul drainage, redness, odor. PAST MEDICAL HISTORY        Diagnosis Date    Cancer [facility])     PROSTATE( radioactive seeds), SIGMOIDx2 yrs no chemo or radiation    Congestive heart failure (CHF) (HCC)     COPD (chronic obstructive pulmonary disease) ([facility] [address]     patient unaware    History of atrial fibrillation 2015    Hyperlipidemia     Hypertension     Obstructive sleep apnea     no machine       PAST SURGICAL HISTORY    Past Surgical History:   Procedure Laterality Date    CYSTOSCOPY  11-20-15    with stent removal and re-insertion    SIGMOIDECTOMY      SIGMOID RESECTION FOR CARCINOMA       FAMILY HISTORY    History reviewed. No pertinent family history.     SOCIAL HISTORY    Social History   Substance Use Topics    Smoking status: Former Smoker     Quit date: 5/10/1965    Smokeless tobacco: Never Used    Alcohol use No       ALLERGIES    Allergies   Allergen Reactions    Clonidine Derivatives Other (See Comments)     hallucinations    Digoxin And Related      hallucinations           Objective:      BP (!) 110/50  Pulse 60  Temp 98.2 °F (36.8 °C) (Oral)   Resp 18  Ht 5' 8.5\" (1.74 m)  Wt 130 lb (59 kg)  SpO2 97%  BMI 19.48 kg/m2  Amos Risk Score: Amos Scale Score: 20    LABS    CBC:   Lab Results   Component Value Date    WBC 11.8 10/02/2017    RBC 4.71 10/02/2017

## 2017-10-03 LAB
ABSOLUTE EOS #: 0 K/UL (ref 0–0.4)
ABSOLUTE LYMPH #: 1.3 K/UL (ref 1–4.8)
ABSOLUTE MONO #: 1 K/UL (ref 0.2–0.8)
ALBUMIN SERPL-MCNC: 3.2 G/DL (ref 3.5–5.2)
ALBUMIN/GLOBULIN RATIO: ABNORMAL (ref 1–2.5)
ALP BLD-CCNC: 236 U/L (ref 40–129)
ALT SERPL-CCNC: 67 U/L (ref 5–41)
ANION GAP SERPL CALCULATED.3IONS-SCNC: 13 MMOL/L (ref 9–17)
AST SERPL-CCNC: 107 U/L
BASOPHILS # BLD: 0 %
BASOPHILS ABSOLUTE: 0 K/UL (ref 0–0.2)
BILIRUB SERPL-MCNC: 4.18 MG/DL (ref 0.3–1.2)
BILIRUBIN DIRECT: 3.58 MG/DL
BILIRUBIN, INDIRECT: 0.6 MG/DL (ref 0–1)
BUN BLDV-MCNC: 19 MG/DL (ref 8–23)
CALCIUM SERPL-MCNC: 9.6 MG/DL (ref 8.6–10.4)
CHLORIDE BLD-SCNC: 90 MMOL/L (ref 98–107)
CO2: 29 MMOL/L (ref 20–31)
CREAT SERPL-MCNC: 1.16 MG/DL (ref 0.7–1.2)
CULTURE: ABNORMAL
CULTURE: ABNORMAL
DIFFERENTIAL TYPE: ABNORMAL
EOSINOPHILS RELATIVE PERCENT: 0 %
GFR AFRICAN AMERICAN: >60 ML/MIN
GFR NON-AFRICAN AMERICAN: >60 ML/MIN
GFR SERPL CREATININE-BSD FRML MDRD: ABNORMAL ML/MIN/{1.73_M2}
GFR SERPL CREATININE-BSD FRML MDRD: ABNORMAL ML/MIN/{1.73_M2}
GLUCOSE BLD-MCNC: 107 MG/DL (ref 70–99)
HCT VFR BLD CALC: 36.2 % (ref 41–53)
HEMOGLOBIN: 11.8 G/DL (ref 13.5–17.5)
LYMPHOCYTES # BLD: 11 %
Lab: ABNORMAL
MCH RBC QN AUTO: 26.4 PG (ref 26–34)
MCHC RBC AUTO-ENTMCNC: 32.6 G/DL (ref 31–37)
MCV RBC AUTO: 80.9 FL (ref 80–100)
MONOCYTES # BLD: 9 %
ORGANISM: ABNORMAL
PDW BLD-RTO: 16.8 % (ref 11.5–14.5)
PLATELET # BLD: 205 K/UL (ref 130–400)
PLATELET ESTIMATE: ABNORMAL
PMV BLD AUTO: 8.5 FL (ref 6–12)
POTASSIUM SERPL-SCNC: 4.3 MMOL/L (ref 3.7–5.3)
PREALBUMIN: 13.3 MG/DL (ref 20–40)
RBC # BLD: 4.47 M/UL (ref 4.5–5.9)
RBC # BLD: ABNORMAL 10*6/UL
SEG NEUTROPHILS: 80 %
SEGMENTED NEUTROPHILS ABSOLUTE COUNT: 9.1 K/UL (ref 1.8–7.7)
SODIUM BLD-SCNC: 132 MMOL/L (ref 135–144)
SPECIMEN DESCRIPTION: ABNORMAL
SPECIMEN DESCRIPTION: ABNORMAL
STATUS: ABNORMAL
TOTAL PROTEIN: 6.7 G/DL (ref 6.4–8.3)
WBC # BLD: 11.4 K/UL (ref 3.5–11)
WBC # BLD: ABNORMAL 10*3/UL

## 2017-10-03 PROCEDURE — 80053 COMPREHEN METABOLIC PANEL: CPT

## 2017-10-03 PROCEDURE — 6370000000 HC RX 637 (ALT 250 FOR IP): Performed by: INTERNAL MEDICINE

## 2017-10-03 PROCEDURE — G0378 HOSPITAL OBSERVATION PER HR: HCPCS

## 2017-10-03 PROCEDURE — 2580000003 HC RX 258: Performed by: INTERNAL MEDICINE

## 2017-10-03 PROCEDURE — 85025 COMPLETE CBC W/AUTO DIFF WBC: CPT

## 2017-10-03 PROCEDURE — 82248 BILIRUBIN DIRECT: CPT

## 2017-10-03 PROCEDURE — 6360000002 HC RX W HCPCS: Performed by: INTERNAL MEDICINE

## 2017-10-03 PROCEDURE — 36415 COLL VENOUS BLD VENIPUNCTURE: CPT

## 2017-10-03 PROCEDURE — 96376 TX/PRO/DX INJ SAME DRUG ADON: CPT

## 2017-10-03 RX ORDER — TAMSULOSIN HYDROCHLORIDE 0.4 MG/1
0.4 CAPSULE ORAL NIGHTLY
Status: DISCONTINUED | OUTPATIENT
Start: 2017-10-03 | End: 2017-10-04 | Stop reason: HOSPADM

## 2017-10-03 RX ORDER — AMIODARONE HYDROCHLORIDE 100 MG/1
100 TABLET ORAL DAILY
Status: ON HOLD | COMMUNITY
End: 2017-10-04 | Stop reason: HOSPADM

## 2017-10-03 RX ORDER — ASPIRIN 81 MG/1
81 TABLET ORAL DAILY
Status: DISCONTINUED | OUTPATIENT
Start: 2017-10-03 | End: 2017-10-04 | Stop reason: HOSPADM

## 2017-10-03 RX ORDER — OXYCODONE HYDROCHLORIDE 15 MG/1
15 TABLET ORAL EVERY 4 HOURS PRN
Status: DISCONTINUED | OUTPATIENT
Start: 2017-10-03 | End: 2017-10-04 | Stop reason: HOSPADM

## 2017-10-03 RX ORDER — DOCUSATE SODIUM 100 MG/1
100 CAPSULE, LIQUID FILLED ORAL NIGHTLY
Status: DISCONTINUED | OUTPATIENT
Start: 2017-10-03 | End: 2017-10-04 | Stop reason: HOSPADM

## 2017-10-03 RX ORDER — AMIODARONE HYDROCHLORIDE 200 MG/1
200 TABLET ORAL DAILY
Status: DISCONTINUED | OUTPATIENT
Start: 2017-10-03 | End: 2017-10-04 | Stop reason: HOSPADM

## 2017-10-03 RX ADMIN — OXYCODONE HYDROCHLORIDE 15 MG: 15 TABLET ORAL at 16:11

## 2017-10-03 RX ADMIN — METOPROLOL TARTRATE 50 MG: 50 TABLET ORAL at 10:38

## 2017-10-03 RX ADMIN — Medication 10 ML: at 10:37

## 2017-10-03 RX ADMIN — ASPIRIN 81 MG: 81 TABLET ORAL at 10:32

## 2017-10-03 RX ADMIN — DOCUSATE SODIUM 100 MG: 100 CAPSULE, LIQUID FILLED ORAL at 22:40

## 2017-10-03 RX ADMIN — OXYCODONE HYDROCHLORIDE 15 MG: 15 TABLET ORAL at 09:05

## 2017-10-03 RX ADMIN — CEFTRIAXONE SODIUM 1 G: 1 INJECTION, POWDER, FOR SOLUTION INTRAMUSCULAR; INTRAVENOUS at 16:41

## 2017-10-03 RX ADMIN — TAMSULOSIN HYDROCHLORIDE 0.4 MG: 0.4 CAPSULE ORAL at 10:32

## 2017-10-03 RX ADMIN — AMIODARONE HYDROCHLORIDE 200 MG: 200 TABLET ORAL at 10:32

## 2017-10-03 RX ADMIN — METOPROLOL TARTRATE 50 MG: 50 TABLET ORAL at 22:40

## 2017-10-03 RX ADMIN — TAMSULOSIN HYDROCHLORIDE 0.4 MG: 0.4 CAPSULE ORAL at 22:40

## 2017-10-03 ASSESSMENT — PAIN DESCRIPTION - DESCRIPTORS
DESCRIPTORS: ACHING
DESCRIPTORS: ACHING

## 2017-10-03 ASSESSMENT — PAIN DESCRIPTION - PAIN TYPE
TYPE: CHRONIC PAIN

## 2017-10-03 ASSESSMENT — PAIN SCALES - GENERAL
PAINLEVEL_OUTOF10: 6
PAINLEVEL_OUTOF10: 4
PAINLEVEL_OUTOF10: 3
PAINLEVEL_OUTOF10: 7

## 2017-10-03 ASSESSMENT — PAIN DESCRIPTION - FREQUENCY: FREQUENCY: INTERMITTENT

## 2017-10-03 ASSESSMENT — PAIN DESCRIPTION - LOCATION
LOCATION: ABDOMEN
LOCATION: ABDOMEN

## 2017-10-03 NOTE — PLAN OF CARE
Problem: Pain:  Goal: Pain level will decrease  Pain level will decrease   Outcome: Ongoing  Goal: Control of acute pain  Control of acute pain   Outcome: Ongoing    Problem: Falls - Risk of  Goal: Absence of falls  Outcome: Ongoing    Problem: Skin Integrity:  Goal: Will show no infection signs and symptoms  Will show no infection signs and symptoms   Outcome: Ongoing

## 2017-10-03 NOTE — PLAN OF CARE
Problem: Pain:  Goal: Pain level will decrease  Pain level will decrease   Outcome: Ongoing    Problem: Falls - Risk of  Goal: Absence of falls  Outcome: Ongoing    Problem: Skin Integrity:  Goal: Will show no infection signs and symptoms  Will show no infection signs and symptoms   Outcome: Ongoing    Problem: Urinary Elimination:  Goal: Ability to recognize the need to void and respond appropriately will improve  Ability to recognize the need to void and respond appropriately will improve   Outcome: Ongoing

## 2017-10-03 NOTE — H&P
History and Physical/admit note      CHIEF COMPLAINT:  Abdominal pain    History of Present Illness: Recurrent recurrent abdominal pain and recurrent on and off times few days , take GERD no dysuria no hematuria occasional frequency no fever no chills        Past Medical History:   Diagnosis Date    Cancer ([facility]     PROSTATE( radioactive seeds), SIGMOIDx2 yrs no chemo or radiation    Congestive heart failure (CHF) (HCC)     [facility]     patient unaware    History of atrial fibrillation 9-    Hyperlipidemia     Hypertension     Obstructive sleep apnea     no machine         Past Surgical History:   Procedure Laterality Date    CYSTOSCOPY  11-20-15    with stent removal and re-insertion    SIGMOIDECTOMY  9-2015    SIGMOID RESECTION FOR CARCINOMA       Medications Prior to Admission:    Prescriptions Prior to Admission: oxyCODONE (OXY-IR) 15 MG immediate release tablet, Take 15 mg by mouth every 4 hours as needed for Pain . docusate sodium (COLACE) 100 MG capsule, Take 100 mg by mouth nightly  metoprolol (LOPRESSOR) 50 MG tablet, Take 1 tablet by mouth 2 times daily  furosemide (LASIX) 40 MG tablet, Take 1 tablet by mouth daily  clopidogrel (PLAVIX) 75 MG tablet, Take 1 tablet by mouth daily  amiodarone (PACERONE) 400 MG tablet, Take 0.5 tablets by mouth daily  pitavastatin (LIVALO) 2 MG TABS tablet, Take 2 mg by mouth nightly  lisinopril (PRINIVIL;ZESTRIL) 10 MG tablet, Take 10 mg by mouth daily  tamsulosin (FLOMAX) 0.4 MG capsule, Take 0.4 mg by mouth daily  aspirin 81 MG tablet, Take 81 mg by mouth daily    Allergies:    Clonidine derivatives and Digoxin and related    Social History:    reports that he quit smoking about 52 years ago. He has never used smokeless tobacco. He reports that he does not drink alcohol. Family History:   family history is not on file.     REVIEW OF SYSTEMS:  Constitutional: negative  Eyes: negative  Ears, nose, mouth, throat, 10/02/2017    BILITOT 1.41 10/01/2017    ALKPHOS 230 10/01/2017     10/01/2017    ALT 57 10/01/2017         ASSESSMENT:    Ca colon: With liver metastases  UTI  Renal insufficiency  Paroxysmal A. 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

## 2017-10-04 VITALS
TEMPERATURE: 98.2 F | HEIGHT: 69 IN | SYSTOLIC BLOOD PRESSURE: 133 MMHG | OXYGEN SATURATION: 95 % | RESPIRATION RATE: 16 BRPM | BODY MASS INDEX: 19.26 KG/M2 | WEIGHT: 130 LBS | DIASTOLIC BLOOD PRESSURE: 51 MMHG | HEART RATE: 67 BPM

## 2017-10-04 PROBLEM — C78.7 CARCINOMA OF COLON METASTATIC TO LIVER (HCC): Status: ACTIVE | Noted: 2017-10-04

## 2017-10-04 PROBLEM — R79.89 ELEVATED LFTS: Status: ACTIVE | Noted: 2017-10-04

## 2017-10-04 PROBLEM — C18.9 CARCINOMA OF COLON METASTATIC TO LIVER (HCC): Status: ACTIVE | Noted: 2017-10-04

## 2017-10-04 PROBLEM — N39.0 URINARY TRACT INFECTION WITHOUT HEMATURIA: Status: ACTIVE | Noted: 2017-10-04

## 2017-10-04 PROBLEM — D64.9 NORMOCYTIC ANEMIA: Status: ACTIVE | Noted: 2017-10-04

## 2017-10-04 PROBLEM — E44.0 MODERATE MALNUTRITION (HCC): Status: ACTIVE | Noted: 2017-10-04

## 2017-10-04 LAB
ABSOLUTE EOS #: 0 K/UL (ref 0–0.4)
ABSOLUTE LYMPH #: 1 K/UL (ref 1–4.8)
ABSOLUTE MONO #: 1 K/UL (ref 0.2–0.8)
ALBUMIN SERPL-MCNC: 3.4 G/DL (ref 3.5–5.2)
ALBUMIN/GLOBULIN RATIO: ABNORMAL (ref 1–2.5)
ALP BLD-CCNC: 236 U/L (ref 40–129)
ALT SERPL-CCNC: 48 U/L (ref 5–41)
ANION GAP SERPL CALCULATED.3IONS-SCNC: 13 MMOL/L (ref 9–17)
AST SERPL-CCNC: 69 U/L
BASOPHILS # BLD: 0 %
BASOPHILS ABSOLUTE: 0 K/UL (ref 0–0.2)
BILIRUB SERPL-MCNC: 3.93 MG/DL (ref 0.3–1.2)
BILIRUBIN DIRECT: 3.18 MG/DL
BILIRUBIN, INDIRECT: 0.75 MG/DL (ref 0–1)
BUN BLDV-MCNC: 18 MG/DL (ref 8–23)
CALCIUM SERPL-MCNC: 9.5 MG/DL (ref 8.6–10.4)
CHLORIDE BLD-SCNC: 93 MMOL/L (ref 98–107)
CO2: 28 MMOL/L (ref 20–31)
CREAT SERPL-MCNC: 0.9 MG/DL (ref 0.7–1.2)
DIFFERENTIAL TYPE: ABNORMAL
EOSINOPHILS RELATIVE PERCENT: 0 %
GFR AFRICAN AMERICAN: >60 ML/MIN
GFR NON-AFRICAN AMERICAN: >60 ML/MIN
GFR SERPL CREATININE-BSD FRML MDRD: ABNORMAL ML/MIN/{1.73_M2}
GFR SERPL CREATININE-BSD FRML MDRD: ABNORMAL ML/MIN/{1.73_M2}
GLUCOSE BLD-MCNC: 117 MG/DL (ref 70–99)
HCT VFR BLD CALC: 36 % (ref 41–53)
HEMOGLOBIN: 11.8 G/DL (ref 13.5–17.5)
LYMPHOCYTES # BLD: 9 %
MCH RBC QN AUTO: 26.5 PG (ref 26–34)
MCHC RBC AUTO-ENTMCNC: 32.8 G/DL (ref 31–37)
MCV RBC AUTO: 80.9 FL (ref 80–100)
MONOCYTES # BLD: 9 %
PDW BLD-RTO: 17 % (ref 11.5–14.5)
PLATELET # BLD: 189 K/UL (ref 130–400)
PLATELET ESTIMATE: ABNORMAL
PMV BLD AUTO: 8.4 FL (ref 6–12)
POTASSIUM SERPL-SCNC: 3.9 MMOL/L (ref 3.7–5.3)
RBC # BLD: 4.45 M/UL (ref 4.5–5.9)
RBC # BLD: ABNORMAL 10*6/UL
SEG NEUTROPHILS: 82 %
SEGMENTED NEUTROPHILS ABSOLUTE COUNT: 9 K/UL (ref 1.8–7.7)
SODIUM BLD-SCNC: 134 MMOL/L (ref 135–144)
TOTAL PROTEIN: 7 G/DL (ref 6.4–8.3)
WBC # BLD: 10.9 K/UL (ref 3.5–11)
WBC # BLD: ABNORMAL 10*3/UL

## 2017-10-04 PROCEDURE — 6370000000 HC RX 637 (ALT 250 FOR IP): Performed by: INTERNAL MEDICINE

## 2017-10-04 PROCEDURE — 36415 COLL VENOUS BLD VENIPUNCTURE: CPT

## 2017-10-04 PROCEDURE — 80053 COMPREHEN METABOLIC PANEL: CPT

## 2017-10-04 PROCEDURE — 2580000003 HC RX 258: Performed by: INTERNAL MEDICINE

## 2017-10-04 PROCEDURE — 96376 TX/PRO/DX INJ SAME DRUG ADON: CPT

## 2017-10-04 PROCEDURE — G0378 HOSPITAL OBSERVATION PER HR: HCPCS

## 2017-10-04 PROCEDURE — 85025 COMPLETE CBC W/AUTO DIFF WBC: CPT

## 2017-10-04 PROCEDURE — 82248 BILIRUBIN DIRECT: CPT

## 2017-10-04 PROCEDURE — 6360000002 HC RX W HCPCS: Performed by: INTERNAL MEDICINE

## 2017-10-04 RX ORDER — LISINOPRIL 2.5 MG/1
2.5 TABLET ORAL DAILY
Qty: 30 TABLET | Refills: 0 | Status: SHIPPED | OUTPATIENT
Start: 2017-10-04

## 2017-10-04 RX ORDER — AMIODARONE HYDROCHLORIDE 200 MG/1
200 TABLET ORAL DAILY
Qty: 30 TABLET | Refills: 3 | Status: SHIPPED | OUTPATIENT
Start: 2017-10-04

## 2017-10-04 RX ORDER — SENNA PLUS 8.6 MG/1
1 TABLET ORAL 2 TIMES DAILY
Qty: 60 TABLET | Refills: 0 | Status: SHIPPED | OUTPATIENT
Start: 2017-10-04 | End: 2018-10-04

## 2017-10-04 RX ORDER — DOCUSATE SODIUM 100 MG/1
100 CAPSULE, LIQUID FILLED ORAL 2 TIMES DAILY
Qty: 60 CAPSULE | Refills: 0 | Status: SHIPPED | OUTPATIENT
Start: 2017-10-04

## 2017-10-04 RX ORDER — CEPHALEXIN 500 MG/1
500 CAPSULE ORAL 3 TIMES DAILY
Qty: 30 CAPSULE | Refills: 0 | Status: SHIPPED | OUTPATIENT
Start: 2017-10-04 | End: 2017-10-14

## 2017-10-04 RX ADMIN — Medication 10 ML: at 08:54

## 2017-10-04 RX ADMIN — CEFTRIAXONE SODIUM 1 G: 1 INJECTION, POWDER, FOR SOLUTION INTRAMUSCULAR; INTRAVENOUS at 16:29

## 2017-10-04 RX ADMIN — OXYCODONE HYDROCHLORIDE 15 MG: 15 TABLET ORAL at 17:02

## 2017-10-04 RX ADMIN — AMIODARONE HYDROCHLORIDE 200 MG: 200 TABLET ORAL at 08:53

## 2017-10-04 RX ADMIN — METOPROLOL TARTRATE 50 MG: 50 TABLET ORAL at 08:53

## 2017-10-04 RX ADMIN — ASPIRIN 81 MG: 81 TABLET ORAL at 08:53

## 2017-10-04 ASSESSMENT — PAIN DESCRIPTION - PAIN TYPE: TYPE: CHRONIC PAIN

## 2017-10-04 ASSESSMENT — PAIN DESCRIPTION - FREQUENCY: FREQUENCY: CONTINUOUS

## 2017-10-04 ASSESSMENT — PAIN DESCRIPTION - ONSET: ONSET: ON-GOING

## 2017-10-04 ASSESSMENT — PAIN SCALES - GENERAL
PAINLEVEL_OUTOF10: 9
PAINLEVEL_OUTOF10: 3

## 2017-10-04 ASSESSMENT — PAIN DESCRIPTION - LOCATION: LOCATION: ABDOMEN

## 2017-10-04 ASSESSMENT — PAIN DESCRIPTION - DESCRIPTORS: DESCRIPTORS: ACHING

## 2017-10-04 NOTE — CONSULTS
[facility]  Urology Consultation    Patient:  Latoya Hollingsworth  MRN: [id number]  YOB: 1934    CHIEF COMPLAINT:  Prostate cancer, urinary tract infection    HISTORY OF PRESENT ILLNESS:   The patient is a 80 y.o. male who presents with metastatic colon cancer,, he has prostate cancer, status post brachytherapy, has doing well from that standpoint,,      Patient was treated with antibiotic  Therapy he has been doing well. Plan for further  Antibiotic treatment as outpatient based on conversations with  Infectious disease specialist    Patient's old records, notes and chart reviewed and summarized above.     Past Medical History:    Past Medical History:   Diagnosis Date    Cancer ([facility]     PROSTATE( radioactive seeds), SIGMOIDx2 yrs no chemo or radiation    Congestive heart failure (CHF) (HCC)     COPD (chronic obstructive pulmonary disease) (HCC)     patient unaware    History of atrial fibrillation 9-    Hyperlipidemia     Hypertension     Obstructive sleep apnea     no machine       Past Surgical History:    Past Surgical History:   Procedure Laterality Date    CYSTOSCOPY  11-20-15    with stent removal and re-insertion    SIGMOIDECTOMY  9-2015    SIGMOID RESECTION FOR CARCINOMA     Previous  surgery: bbrachytherapy     Medications:    Scheduled Meds:   tamsulosin  0.4 mg Oral Nightly    amiodarone  200 mg Oral Daily    aspirin  81 mg Oral Daily    docusate sodium  100 mg Oral Nightly    pneumococcal 13-valent conjugate  0.5 mL Intramuscular Once    metoprolol tartrate  50 mg Oral BID    sodium chloride flush  10 mL Intravenous 2 times per day    influenza virus vaccine  0.5 mL Intramuscular Once    cefTRIAXone (ROCEPHIN) IV  1 g Intravenous Q24H     Continuous Infusions:   PRN Meds:.oxyCODONE, bisacodyl, sodium chloride flush, sodium chloride flush    Allergies:  Clonidine derivatives and Digoxin and related    Social History:    Social History     Social History  Marital status:      Spouse name: N/A    Number of children: N/A    Years of education: N/A     Occupational History    Not on file. Social History Main Topics    Smoking status: Former Smoker     Quit date: 5/10/1965    Smokeless tobacco: Never Used    Alcohol use No    Drug use: Not on file    Sexual activity: Not on file     Other Topics Concern    Not on file     Social History Narrative       Family History:  History reviewed. No pertinent family history. Previous Urologic Family history: none    REVIEW OF SYSTEMS:  Constitutional: weight loss  Eyes: negative  Respiratory: negative  Cardiovascular: negative  Gastrointestinal: metastatic colon cancer  Genitourinary: see HPI  Musculoskeletal: negative  Skin: negative   Neurological: negative  Hematological/Lymphatic: negative  Psychological: negative    Physical Exam:    This a 80 y.o. male   Patient Vitals for the past 24 hrs:   BP Temp Temp src Pulse Resp SpO2   10/03/17 1909 (!) 140/45 97.9 °F (36.6 °C) Oral 64 19 98 %   10/03/17 0707 (!) 131/49 98.8 °F (37.1 °C) Oral 71 16 94 %     Constitutional: Patient in no acute distress; Neuro: alert and oriented to person place and time. Psych: Mood and affect normal.  Skin: Normal  Lungs: Respiratory effort normal  Cardiovascular:  Normal peripheral pulses  Abdomen: Soft, non-tender, non-distended with no CVA, flank pain, hepatosplenomegaly or hernia. Kidneys normal.  Bladder non-tender and not distended. Lymphatics: no palpable lymphadenopathy  Penis normal and circumcised  Urethral meatus normal  Scrotal exam normal  Testicles normal bilaterally  Epididymis normal bilaterally  No evidence of inguinal hernia  Anus and perineum normal  Normal rectal tone with no masses  Prostate soft, non-tender to palpation. No palpable nodules. Estimated 40 grams.    LABS:  Recent Labs      10/01/17   1450  10/02/17   0551  10/03/17   0542   WBC  8.6  11.8*  11.4*   HGB  12.2*  12.5*  11.8*   HCT

## 2017-10-04 NOTE — PROGRESS NOTES
Nutrition Assessment    Type and Reason for Visit: Initial, Positive Nutrition Screen, Consult (PU/non-healing wound / Wound healing)    Nutrition Recommendations: 1. Suggest continuing on cardiac diet. 2. Consider ordering Ensure Enlive (strawberry) ONS, x 3/day. Malnutrition Assessment:  · Malnutrition Status: Mild Malnutrition  · Findings of the 6 clinical characteristics of malnutrition (Minimum of 2 out of 6 clinical characteristics is required to make the diagnosis of moderate or severe Protein Calorie Malnutrition based on AND/ASPEN Guidelines):  1. Energy Intake-Less than or equal to 50%, greater than or equal to 5 days    2. Weight Loss-2% loss or greater,  (in 21 months)  3. Fat Loss-Mild subcutaneous fat loss, Orbital  4. Muscle Loss-Mild muscle mass loss, Temples (temporalis muscle)  5. Fluid Accumulation-No significant fluid accumulation  6.  Strength-Not measured    Nutrition Diagnosis:   · Problem: Increased nutrient needs  · Etiology: related to Increased demand for energy/nutrients due to (Wounds)     Signs and symptoms:  as evidenced by Presence of wounds, Weight loss    Nutrition Assessment:  · Subjective Assessment: Per Pt:  +abdominal pain (chronic), appetite is good, and is eating well @ meals. Noted +wt loss of 1.9 kg (3.1%) since 1/5/16 (60.9 kg). This is not considered significant over time frame of past x 21 months.     · Nutrition-Focused Physical Findings: GI:  +soft, +non-distended, +nontender; PV:  WDL; Skin:  +abrasions, multiple, top of head/chest/arms  · Wound Type: Multiple, Skin Tears  · Current Nutrition Therapies:  · Oral Diet Orders: Cardiac   · Oral Diet intake: % (per Pt)  · Anthropometric Measures:  · Ht: 5' 8.5\" (174 cm)   · Admission Body Wt: 130 lb 1.1 oz (59 kg)  · Usual Body Wt: 134 lb 4.2 oz (60.9 kg)  · % Weight Change: 3.1%,  x 21 months  · Ideal Body Wt: 158 lb 11.7 oz (72 kg), % Ideal Body 82%  · BMI Classification: BMI 18.5 - 24.9 Normal Weight  · Comparative Standards (Estimated Nutrition Needs):  · Estimated Daily Total Kcal: 2,000-2,150 kcal (wt gain)  · Estimated Daily Protein (g): 80-90 g    Estimated Intake vs Estimated Needs: Intake Improving    Nutrition Risk Level: High    Nutrition Interventions:   Continue current diet, Start ONS  Continued Inpatient Monitoring    Nutrition Evaluation:   · Evaluation: Goals set   · Goals: PO intake to meet >75% of estimated kcal/protein needs, with good GI tolerance    · Monitoring: Meal Intake, Supplement Intake, Diet Tolerance, Skin Integrity, Wound Healing, Weight, Pertinent Labs    See Adult Nutrition Doc Flowsheet for more detail.      Electronically signed by Scott Dutton, KEYSHA, LD on 10/3/17 at 8:25 PM    Contact Number: [phone]

## 2017-10-04 NOTE — PLAN OF CARE
Problem: Pain:  Goal: Pain level will decrease  Pain level will decrease   Outcome: Ongoing  Goal: Control of acute pain  Control of acute pain   Outcome: Ongoing    Problem: Falls - Risk of  Goal: Absence of falls  Outcome: Ongoing

## 2017-10-04 NOTE — PLAN OF CARE
Problem: Nutrition  Goal: Optimal nutrition therapy  Nutrition Problem: Increased nutrient needs  Intervention: Food and/or Nutrient Delivery: Continue current diet, Start ONS  Nutritional Goals: PO intake to meet >75% of estimated kcal/protein needs, with good GI tolerance  Outcome: Ongoing

## 2017-10-04 NOTE — FLOWSHEET NOTE
Kevin Valdivia had a short friendly visit with patient and son who was present. Patient came in with a urinary tract infection. Patient and son said he was getting ready to go home. Was being discharged today. Patient was receptive and grateful for my visit. 10/04/17 4592   Encounter Summary   Services provided to: Patient   Referral/Consult From: [facility]   Continue Visiting (10/4/17)   Complexity of Encounter Low   Length of Encounter 15 minutes   Spiritual Assessment Completed Yes   Routine   Type Initial   Assessment Approachable   Intervention Active listening;Sustaining presence/ Ministry of presence; Discussed illness/injury and it's impact   Outcome Expressed gratitude;Engaged in conversation;Receptive

## 2017-10-05 LAB
EKG ATRIAL RATE: 66 BPM
EKG P AXIS: 90 DEGREES
EKG P-R INTERVAL: 182 MS
EKG Q-T INTERVAL: 488 MS
EKG QRS DURATION: 176 MS
EKG QTC CALCULATION (BAZETT): 511 MS
EKG R AXIS: -5 DEGREES
EKG T AXIS: 99 DEGREES
EKG VENTRICULAR RATE: 66 BPM

## 2017-11-10 ENCOUNTER — HOSPITAL ENCOUNTER (INPATIENT)
Age: 82
LOS: 3 days | Discharge: HOME HEALTH | DRG: 604 | End: 2017-11-13
Admitting: INTERNAL MEDICINE
Payer: MEDICARE

## 2017-11-10 ENCOUNTER — APPOINTMENT (OUTPATIENT)
Dept: GENERAL RADIOLOGY | Age: 82
DRG: 604 | End: 2017-11-10
Payer: MEDICARE

## 2017-11-10 DIAGNOSIS — L02.219 CELLULITIS AND ABSCESS OF TRUNK: ICD-10-CM

## 2017-11-10 DIAGNOSIS — R53.1 GENERAL WEAKNESS: ICD-10-CM

## 2017-11-10 DIAGNOSIS — L03.319 CELLULITIS AND ABSCESS OF TRUNK: ICD-10-CM

## 2017-11-10 DIAGNOSIS — R78.81 BACTEREMIA: Primary | ICD-10-CM

## 2017-11-10 LAB
-: NORMAL
ABSOLUTE EOS #: 0 K/UL (ref 0–0.4)
ABSOLUTE IMMATURE GRANULOCYTE: ABNORMAL K/UL (ref 0–0.3)
ABSOLUTE LYMPH #: 0.76 K/UL (ref 1–4.8)
ABSOLUTE MONO #: 2.08 K/UL (ref 0.2–0.8)
AMORPHOUS: NORMAL
ANION GAP SERPL CALCULATED.3IONS-SCNC: 13 MMOL/L (ref 9–17)
BACTERIA: NORMAL
BASOPHILS # BLD: 0 %
BASOPHILS ABSOLUTE: 0 K/UL (ref 0–0.2)
BILIRUBIN URINE: NEGATIVE
BUN BLDV-MCNC: 12 MG/DL (ref 8–23)
BUN/CREAT BLD: 15 (ref 9–20)
CALCIUM SERPL-MCNC: 9 MG/DL (ref 8.6–10.4)
CASTS UA: NORMAL /LPF
CHLORIDE BLD-SCNC: 91 MMOL/L (ref 98–107)
CK MB: <1 NG/ML
CO2: 26 MMOL/L (ref 20–31)
COLOR: YELLOW
COMMENT UA: ABNORMAL
CREAT SERPL-MCNC: 0.79 MG/DL (ref 0.7–1.2)
CRYSTALS, UA: NORMAL /HPF
DIFFERENTIAL TYPE: ABNORMAL
EKG ATRIAL RATE: 87 BPM
EKG P AXIS: 56 DEGREES
EKG P-R INTERVAL: 178 MS
EKG Q-T INTERVAL: 450 MS
EKG QRS DURATION: 174 MS
EKG QTC CALCULATION (BAZETT): 541 MS
EKG R AXIS: -16 DEGREES
EKG T AXIS: 98 DEGREES
EKG VENTRICULAR RATE: 87 BPM
EOSINOPHILS RELATIVE PERCENT: 0 %
EPITHELIAL CELLS UA: NORMAL /HPF
GFR AFRICAN AMERICAN: >60 ML/MIN
GFR NON-AFRICAN AMERICAN: >60 ML/MIN
GFR SERPL CREATININE-BSD FRML MDRD: ABNORMAL ML/MIN/{1.73_M2}
GFR SERPL CREATININE-BSD FRML MDRD: ABNORMAL ML/MIN/{1.73_M2}
GLUCOSE BLD-MCNC: 102 MG/DL (ref 70–99)
GLUCOSE URINE: NEGATIVE
HCT VFR BLD CALC: 36.9 % (ref 41–53)
HEMOGLOBIN: 12.1 G/DL (ref 13.5–17.5)
IMMATURE GRANULOCYTES: ABNORMAL %
KETONES, URINE: NEGATIVE
LACTIC ACID, WHOLE BLOOD: NORMAL MMOL/L (ref 0.7–2.1)
LACTIC ACID: 1.2 MMOL/L (ref 0.5–2.2)
LEUKOCYTE ESTERASE, URINE: NEGATIVE
LIPASE: 55 U/L (ref 13–60)
LYMPHOCYTES # BLD: 4 %
MCH RBC QN AUTO: 26.8 PG (ref 26–34)
MCHC RBC AUTO-ENTMCNC: 32.6 G/DL (ref 31–37)
MCV RBC AUTO: 82.1 FL (ref 80–100)
MONOCYTES # BLD: 11 %
MORPHOLOGY: ABNORMAL
MUCUS: NORMAL
MYELOCYTES ABSOLUTE COUNT: 0.38 K/UL
MYELOCYTES: 2 %
MYOGLOBIN: 23 NG/ML (ref 28–72)
NITRITE, URINE: NEGATIVE
OTHER OBSERVATIONS UA: NORMAL
PDW BLD-RTO: 16.8 % (ref 11.5–14.5)
PH UA: 7 (ref 5–8)
PLATELET # BLD: 241 K/UL (ref 130–400)
PLATELET ESTIMATE: ABNORMAL
PMV BLD AUTO: 7.7 FL (ref 6–12)
POTASSIUM SERPL-SCNC: 4.1 MMOL/L (ref 3.7–5.3)
PROTEIN UA: ABNORMAL
RBC # BLD: 4.5 M/UL (ref 4.5–5.9)
RBC # BLD: ABNORMAL 10*6/UL
RBC UA: NORMAL /HPF (ref 0–2)
RENAL EPITHELIAL, UA: NORMAL /HPF
SEG NEUTROPHILS: 83 %
SEGMENTED NEUTROPHILS ABSOLUTE COUNT: 15.68 K/UL (ref 1.8–7.7)
SERUM OSMOLALITY: 272 MOSM/KG (ref 282–298)
SODIUM BLD-SCNC: 130 MMOL/L (ref 135–144)
SPECIFIC GRAVITY UA: 1.01 (ref 1–1.03)
TRICHOMONAS: NORMAL
TROPONIN INTERP: ABNORMAL
TROPONIN T: <0.03 NG/ML
TURBIDITY: CLEAR
URINE HGB: NEGATIVE
UROBILINOGEN, URINE: ABNORMAL
WBC # BLD: 18.9 K/UL (ref 3.5–11)
WBC # BLD: ABNORMAL 10*3/UL
WBC UA: NORMAL /HPF (ref 0–5)
YEAST: NORMAL

## 2017-11-10 PROCEDURE — 2500000003 HC RX 250 WO HCPCS

## 2017-11-10 PROCEDURE — 93005 ELECTROCARDIOGRAM TRACING: CPT

## 2017-11-10 PROCEDURE — 87040 BLOOD CULTURE FOR BACTERIA: CPT

## 2017-11-10 PROCEDURE — 83874 ASSAY OF MYOGLOBIN: CPT

## 2017-11-10 PROCEDURE — 85025 COMPLETE CBC W/AUTO DIFF WBC: CPT

## 2017-11-10 PROCEDURE — 82553 CREATINE MB FRACTION: CPT

## 2017-11-10 PROCEDURE — 6360000002 HC RX W HCPCS

## 2017-11-10 PROCEDURE — 6370000000 HC RX 637 (ALT 250 FOR IP): Performed by: INTERNAL MEDICINE

## 2017-11-10 PROCEDURE — 96365 THER/PROPH/DIAG IV INF INIT: CPT

## 2017-11-10 PROCEDURE — 99285 EMERGENCY DEPT VISIT HI MDM: CPT

## 2017-11-10 PROCEDURE — 71020 XR CHEST STANDARD TWO VW: CPT

## 2017-11-10 PROCEDURE — 83690 ASSAY OF LIPASE: CPT

## 2017-11-10 PROCEDURE — 83605 ASSAY OF LACTIC ACID: CPT

## 2017-11-10 PROCEDURE — 80048 BASIC METABOLIC PNL TOTAL CA: CPT

## 2017-11-10 PROCEDURE — 84484 ASSAY OF TROPONIN QUANT: CPT

## 2017-11-10 PROCEDURE — 1200000000 HC SEMI PRIVATE

## 2017-11-10 PROCEDURE — 96375 TX/PRO/DX INJ NEW DRUG ADDON: CPT

## 2017-11-10 PROCEDURE — 81001 URINALYSIS AUTO W/SCOPE: CPT

## 2017-11-10 PROCEDURE — 83930 ASSAY OF BLOOD OSMOLALITY: CPT

## 2017-11-10 PROCEDURE — 87086 URINE CULTURE/COLONY COUNT: CPT

## 2017-11-10 RX ORDER — LISINOPRIL 2.5 MG/1
2.5 TABLET ORAL DAILY
Status: DISCONTINUED | OUTPATIENT
Start: 2017-11-10 | End: 2017-11-10

## 2017-11-10 RX ORDER — SODIUM CHLORIDE 0.9 % (FLUSH) 0.9 %
10 SYRINGE (ML) INJECTION PRN
Status: DISCONTINUED | OUTPATIENT
Start: 2017-11-10 | End: 2017-11-13 | Stop reason: HOSPADM

## 2017-11-10 RX ORDER — TAMSULOSIN HYDROCHLORIDE 0.4 MG/1
0.4 CAPSULE ORAL DAILY
Status: DISCONTINUED | OUTPATIENT
Start: 2017-11-10 | End: 2017-11-13 | Stop reason: HOSPADM

## 2017-11-10 RX ORDER — VANCOMYCIN HYDROCHLORIDE 1 G/200ML
1000 INJECTION, SOLUTION INTRAVENOUS ONCE
Status: COMPLETED | OUTPATIENT
Start: 2017-11-10 | End: 2017-11-10

## 2017-11-10 RX ORDER — VANCOMYCIN HYDROCHLORIDE 1 G/200ML
15 INJECTION, SOLUTION INTRAVENOUS EVERY 12 HOURS
Status: DISCONTINUED | OUTPATIENT
Start: 2017-11-10 | End: 2017-11-10 | Stop reason: SDUPTHER

## 2017-11-10 RX ORDER — AMIODARONE HYDROCHLORIDE 200 MG/1
200 TABLET ORAL DAILY
Status: DISCONTINUED | OUTPATIENT
Start: 2017-11-10 | End: 2017-11-13 | Stop reason: HOSPADM

## 2017-11-10 RX ORDER — DOCUSATE SODIUM 100 MG/1
100 CAPSULE, LIQUID FILLED ORAL 2 TIMES DAILY
Status: DISCONTINUED | OUTPATIENT
Start: 2017-11-10 | End: 2017-11-10

## 2017-11-10 RX ORDER — SODIUM CHLORIDE 0.9 % (FLUSH) 0.9 %
10 SYRINGE (ML) INJECTION PRN
Status: DISCONTINUED | OUTPATIENT
Start: 2017-11-10 | End: 2017-11-10 | Stop reason: SDUPTHER

## 2017-11-10 RX ORDER — ACETAMINOPHEN 325 MG/1
650 TABLET ORAL EVERY 4 HOURS PRN
Status: DISCONTINUED | OUTPATIENT
Start: 2017-11-10 | End: 2017-11-13 | Stop reason: HOSPADM

## 2017-11-10 RX ORDER — SODIUM CHLORIDE 0.9 % (FLUSH) 0.9 %
10 SYRINGE (ML) INJECTION EVERY 12 HOURS SCHEDULED
Status: DISCONTINUED | OUTPATIENT
Start: 2017-11-10 | End: 2017-11-13 | Stop reason: HOSPADM

## 2017-11-10 RX ORDER — SENNA PLUS 8.6 MG/1
1 TABLET ORAL 2 TIMES DAILY
Status: DISCONTINUED | OUTPATIENT
Start: 2017-11-10 | End: 2017-11-10

## 2017-11-10 RX ORDER — SODIUM CHLORIDE 0.9 % (FLUSH) 0.9 %
10 SYRINGE (ML) INJECTION EVERY 12 HOURS SCHEDULED
Status: DISCONTINUED | OUTPATIENT
Start: 2017-11-10 | End: 2017-11-10 | Stop reason: SDUPTHER

## 2017-11-10 RX ORDER — ASPIRIN 81 MG/1
81 TABLET ORAL DAILY
Status: DISCONTINUED | OUTPATIENT
Start: 2017-11-10 | End: 2017-11-13 | Stop reason: HOSPADM

## 2017-11-10 RX ORDER — FUROSEMIDE 40 MG/1
40 TABLET ORAL DAILY
Status: DISCONTINUED | OUTPATIENT
Start: 2017-11-10 | End: 2017-11-13 | Stop reason: HOSPADM

## 2017-11-10 RX ADMIN — ASPIRIN 81 MG: 81 TABLET, COATED ORAL at 22:09

## 2017-11-10 RX ADMIN — TAZOBACTAM SODIUM AND PIPERACILLIN SODIUM 3.38 G: 375; 3 INJECTION, SOLUTION INTRAVENOUS at 20:36

## 2017-11-10 RX ADMIN — AMIODARONE HYDROCHLORIDE 200 MG: 200 TABLET ORAL at 22:09

## 2017-11-10 RX ADMIN — VANCOMYCIN HYDROCHLORIDE 1000 MG: 1 INJECTION, SOLUTION INTRAVENOUS at 13:04

## 2017-11-10 RX ADMIN — TAZOBACTAM SODIUM AND PIPERACILLIN SODIUM 3.38 G: 375; 3 INJECTION, SOLUTION INTRAVENOUS at 12:05

## 2017-11-10 RX ADMIN — METOPROLOL TARTRATE 25 MG: 25 TABLET ORAL at 22:09

## 2017-11-10 RX ADMIN — TAMSULOSIN HYDROCHLORIDE 0.4 MG: 0.4 CAPSULE ORAL at 22:09

## 2017-11-10 ASSESSMENT — ENCOUNTER SYMPTOMS
SORE THROAT: 1
VOMITING: 0
EYE DISCHARGE: 1
EYE REDNESS: 1
EYE PAIN: 1
NAUSEA: 0
ABDOMINAL DISTENTION: 1
ABDOMINAL PAIN: 0
TROUBLE SWALLOWING: 1
SHORTNESS OF BREATH: 1
SINUS PRESSURE: 1
CONSTIPATION: 1
EYE ITCHING: 1

## 2017-11-10 ASSESSMENT — PAIN SCALES - GENERAL
PAINLEVEL_OUTOF10: 0

## 2017-11-10 NOTE — ED NOTES
Pt presents to er via ems with c/o generalized weakness. Pt son states pt has had s/sx for past 2-3 days. Pt son states pt has hx of UTIs. Pt son states pt has been saying things that do not make a lot of sense recently and feels he may have another uit. Pt son states pt has to urinate frequently. Pt alert and oriented. Pt answering questions appropriately. Skin warm and dry resp even and non-labored. Pt has drainage noted to left eye.       Sarah Kam RN  11/10/17 3592

## 2017-11-10 NOTE — ED NOTES
Bed: 23  Expected date: 11/10/17  Expected time: 9:50 AM  Means of arrival: [address]  Comments:  [facility].  [facility], [address] [place] [address]  11/10/17 1004

## 2017-11-10 NOTE — ED PROVIDER NOTES
atrial fibrillation 9-    Hyperlipidemia     Hypertension     Obstructive sleep apnea     no machine       SURGICAL HISTORY           Procedure Laterality Date    CYSTOSCOPY  11-20-15    with stent removal and re-insertion    SIGMOIDECTOMY  9-2015    SIGMOID RESECTION FOR CARCINOMA         FAMILY HISTORY     History reviewed. No pertinent family history. No family status information on file. SOCIAL HISTORY      reports that he quit smoking about 52 years ago. He has never used smokeless tobacco. He reports that he does not drink alcohol or use drugs. REVIEW OF SYSTEMS    (2-9 systems for level 4, 10 or more for level 5)     Review of Systems   Constitutional: Positive for activity change, appetite change, chills, fatigue and fever. HENT: Positive for congestion, sinus pressure, sore throat and trouble swallowing. Eyes: Positive for pain, discharge, redness and itching. Respiratory: Positive for shortness of breath. Cardiovascular: Negative. Gastrointestinal: Positive for abdominal distention and constipation. Negative for abdominal pain, nausea and vomiting. Endocrine: Negative for polyphagia and polyuria. Genitourinary: Negative for flank pain, frequency and hematuria. Musculoskeletal: Positive for gait problem. Negative for arthralgias, neck pain and neck stiffness. Skin: Positive for pallor. Neurological: Positive for dizziness, weakness and light-headedness. Psychiatric/Behavioral: Negative for agitation and behavioral problems. As an open wound on his chest from a previous squamous cell carcinoma removal and one on his 2nd toe on his left foot that began draining several days ago. I'm not certain that this is the source for his illness however     Except as noted above the remainder of the review of systems was reviewed and negative.      PHYSICAL EXAM    (up to 7 for level 4, 8 or more for level 5)     ED Triage Vitals [11/10/17 1020]   BP Temp Temp Source monitor leads. Cardiac silhouette upper limits of normal but stable. Mediastinal structures appropriate for slight rotation to the left, and unchanged Prominent but unchanged markings. Small basilar nodular densities corresponding to CT findings. No localized pulmonary opacity suspicious for infiltrate or edema, and no blunting of the costophrenic angles. Mild apical pleural thickening, unchanged. DJD spine and left shoulder, but bones and soft tissues appear unchanged and intact. No acute cardiopulmonary disease. Left-sided port. Interpretation per the Radiologist below, if available at the time of this note:    XR CHEST STANDARD (2 VW)   Final Result   No acute cardiopulmonary disease. Left-sided port.          NM URINARY BLADDER RESIDUAL SCAN    (Results Pending)         ED BEDSIDE ULTRASOUND:   Performed by ED Physician - none    LABS:  Labs Reviewed   BASIC METABOLIC PANEL - Abnormal; Notable for the following:        Result Value    Glucose 102 (*)     Sodium 130 (*)     Chloride 91 (*)     All other components within normal limits   CBC WITH AUTO DIFFERENTIAL - Abnormal; Notable for the following:     WBC 18.9 (*)     Hemoglobin 12.1 (*)     Hematocrit 36.9 (*)     RDW 16.8 (*)     Myelocytes 2 (*)     Segs Absolute 15.68 (*)     Absolute Lymph # 0.76 (*)     Absolute Mono # 2.08 (*)     Myelocytes Absolute 0.38 (*)     All other components within normal limits   OSMOLALITY - Abnormal; Notable for the following:     Serum Osmolality 272 (*)     All other components within normal limits   TROP/MYOGLOBIN - Abnormal; Notable for the following:     Myoglobin 23 (*)     All other components within normal limits   URINALYSIS - Abnormal; Notable for the following:     Protein, UA 1+ (*)     Urobilinogen, Urine ELEVATED (*)     All other components within normal limits   CULTURE BLOOD #1   CULTURE BLOOD #1   URINE CULTURE   LIPASE   CK-MB   LACTIC ACID, PLASMA   MICROSCOPIC URINALYSIS       All other labs were within normal range or not returned as of this dictation. EMERGENCY DEPARTMENT COURSE and DIFFERENTIAL DIAGNOSIS/MDM:   Vitals:    Vitals:    11/10/17 1020   BP: 137/64   Pulse: 90   Resp: 19   Temp: 98.8 °F (37.1 °C)   TempSrc: Oral   SpO2: 97%   Weight: 130 lb (59 kg)     Patient's cultured 18,000 white count remains hemodynamically stable with a normal lactic acid. His source could be his left eye or his infusion port. He will be started on a sepsis protocol for unknown etiology admitted to the medicine service his primary is Dr. Laxmi Moyer. Uncertain whether the skin infections or the [id number] is the site he is treated accordingly    CONSULTS:  PHARMACY TO DOSE VANCOMYCIN  IP CONSULT TO INTERNAL MEDICINE    PROCEDURES:  Not clinically indicated. FINAL IMPRESSION      1. Bacteremia    2. General weakness    3. Cellulitis and abscess of trunk          DISPOSITION/PLAN   DISPOSITION Decision to Admit    PATIENT REFERRED TO:   No follow-up provider specified.     DISCHARGE MEDICATIONS:     New Prescriptions    No medications on file           (Please note that portions of this note were completed with a voice recognition program.  Efforts were made to edit the dictations but occasionally words are mis-transcribed.)    Liseth Pop MD  Attending Emergency Physician           Liseth Pop MD  11/10/17 Rachele Goodman MD  11/10/17 3980

## 2017-11-11 PROBLEM — E43 SEVERE PROTEIN-CALORIE MALNUTRITION (GOMEZ: LESS THAN 60% OF STANDARD WEIGHT) (HCC): Status: ACTIVE | Noted: 2017-10-04

## 2017-11-11 LAB
-: NORMAL
ABSOLUTE EOS #: 0 K/UL (ref 0–0.4)
ABSOLUTE IMMATURE GRANULOCYTE: ABNORMAL K/UL (ref 0–0.3)
ABSOLUTE LYMPH #: 1.1 K/UL (ref 1–4.8)
ABSOLUTE MONO #: 1.3 K/UL (ref 0.2–0.8)
ALBUMIN SERPL-MCNC: 2.5 G/DL (ref 3.5–5.2)
ALBUMIN/GLOBULIN RATIO: ABNORMAL (ref 1–2.5)
ALP BLD-CCNC: 126 U/L (ref 40–129)
ALT SERPL-CCNC: 28 U/L (ref 5–41)
AMORPHOUS: NORMAL
ANION GAP SERPL CALCULATED.3IONS-SCNC: 16 MMOL/L (ref 9–17)
AST SERPL-CCNC: 36 U/L
BACTERIA: NORMAL
BASOPHILS # BLD: 0 %
BASOPHILS ABSOLUTE: 0.1 K/UL (ref 0–0.2)
BILIRUB SERPL-MCNC: 1.06 MG/DL (ref 0.3–1.2)
BILIRUBIN DIRECT: 0.53 MG/DL
BILIRUBIN URINE: NEGATIVE
BILIRUBIN, INDIRECT: 0.53 MG/DL (ref 0–1)
BUN BLDV-MCNC: 16 MG/DL (ref 8–23)
CALCIUM SERPL-MCNC: 8.6 MG/DL (ref 8.6–10.4)
CASTS UA: NORMAL /LPF
CHLORIDE BLD-SCNC: 92 MMOL/L (ref 98–107)
CO2: 23 MMOL/L (ref 20–31)
COLOR: YELLOW
COMMENT UA: ABNORMAL
CORTISOL COLLECTION INFO: ABNORMAL
CORTISOL: 20.3 UG/DL (ref 2.7–18.4)
CREAT SERPL-MCNC: 0.77 MG/DL (ref 0.7–1.2)
CRYSTALS, UA: NORMAL /HPF
CULTURE: NO GROWTH
CULTURE: NORMAL
DIFFERENTIAL TYPE: ABNORMAL
EOSINOPHILS RELATIVE PERCENT: 0 %
EPITHELIAL CELLS UA: NORMAL /HPF
GFR AFRICAN AMERICAN: >60 ML/MIN
GFR NON-AFRICAN AMERICAN: >60 ML/MIN
GFR SERPL CREATININE-BSD FRML MDRD: ABNORMAL ML/MIN/{1.73_M2}
GFR SERPL CREATININE-BSD FRML MDRD: ABNORMAL ML/MIN/{1.73_M2}
GLUCOSE BLD-MCNC: 87 MG/DL (ref 70–99)
GLUCOSE URINE: NEGATIVE
HCT VFR BLD CALC: 34.8 % (ref 41–53)
HEMOGLOBIN: 11.2 G/DL (ref 13.5–17.5)
IMMATURE GRANULOCYTES: ABNORMAL %
KETONES, URINE: NEGATIVE
LEUKOCYTE ESTERASE, URINE: NEGATIVE
LYMPHOCYTES # BLD: 7 %
Lab: NORMAL
MCH RBC QN AUTO: 26.8 PG (ref 26–34)
MCHC RBC AUTO-ENTMCNC: 32.3 G/DL (ref 31–37)
MCV RBC AUTO: 83.1 FL (ref 80–100)
MONOCYTES # BLD: 9 %
MUCUS: NORMAL
NITRITE, URINE: NEGATIVE
OTHER OBSERVATIONS UA: NORMAL
PDW BLD-RTO: 17.2 % (ref 11.5–14.5)
PH UA: 6 (ref 5–8)
PLATELET # BLD: 217 K/UL (ref 130–400)
PLATELET ESTIMATE: ABNORMAL
PMV BLD AUTO: 7.6 FL (ref 6–12)
POTASSIUM SERPL-SCNC: 3.6 MMOL/L (ref 3.7–5.3)
PROTEIN UA: NEGATIVE
RBC # BLD: 4.19 M/UL (ref 4.5–5.9)
RBC # BLD: ABNORMAL 10*6/UL
RBC UA: NORMAL /HPF (ref 0–2)
RENAL EPITHELIAL, UA: NORMAL /HPF
SEG NEUTROPHILS: 84 %
SEGMENTED NEUTROPHILS ABSOLUTE COUNT: 11.7 K/UL (ref 1.8–7.7)
SODIUM BLD-SCNC: 131 MMOL/L (ref 135–144)
SPECIFIC GRAVITY UA: 1.01 (ref 1–1.03)
SPECIMEN DESCRIPTION: NORMAL
SPECIMEN DESCRIPTION: NORMAL
STATUS: NORMAL
TOTAL PROTEIN: 6.5 G/DL (ref 6.4–8.3)
TRICHOMONAS: NORMAL
TSH SERPL DL<=0.05 MIU/L-ACNC: 1.33 MIU/L (ref 0.3–5)
TURBIDITY: CLEAR
URINE HGB: ABNORMAL
UROBILINOGEN, URINE: NORMAL
WBC # BLD: 14.2 K/UL (ref 3.5–11)
WBC # BLD: ABNORMAL 10*3/UL
WBC UA: NORMAL /HPF (ref 0–5)
YEAST: NORMAL

## 2017-11-11 PROCEDURE — 36415 COLL VENOUS BLD VENIPUNCTURE: CPT

## 2017-11-11 PROCEDURE — 85025 COMPLETE CBC W/AUTO DIFF WBC: CPT

## 2017-11-11 PROCEDURE — 2500000003 HC RX 250 WO HCPCS

## 2017-11-11 PROCEDURE — 99222 1ST HOSP IP/OBS MODERATE 55: CPT | Performed by: INTERNAL MEDICINE

## 2017-11-11 PROCEDURE — 80053 COMPREHEN METABOLIC PANEL: CPT

## 2017-11-11 PROCEDURE — 6360000002 HC RX W HCPCS: Performed by: INTERNAL MEDICINE

## 2017-11-11 PROCEDURE — 82248 BILIRUBIN DIRECT: CPT

## 2017-11-11 PROCEDURE — 81001 URINALYSIS AUTO W/SCOPE: CPT

## 2017-11-11 PROCEDURE — 6370000000 HC RX 637 (ALT 250 FOR IP): Performed by: INTERNAL MEDICINE

## 2017-11-11 PROCEDURE — 1200000000 HC SEMI PRIVATE

## 2017-11-11 PROCEDURE — 84443 ASSAY THYROID STIM HORMONE: CPT

## 2017-11-11 PROCEDURE — 82533 TOTAL CORTISOL: CPT

## 2017-11-11 PROCEDURE — 2580000003 HC RX 258: Performed by: INTERNAL MEDICINE

## 2017-11-11 RX ORDER — SODIUM CHLORIDE 9 MG/ML
INJECTION, SOLUTION INTRAVENOUS CONTINUOUS
Status: DISCONTINUED | OUTPATIENT
Start: 2017-11-11 | End: 2017-11-13 | Stop reason: HOSPADM

## 2017-11-11 RX ORDER — OXYCODONE HYDROCHLORIDE 15 MG/1
15 TABLET ORAL DAILY
Status: DISCONTINUED | OUTPATIENT
Start: 2017-11-11 | End: 2017-11-13 | Stop reason: HOSPADM

## 2017-11-11 RX ADMIN — SODIUM CHLORIDE: 9 INJECTION, SOLUTION INTRAVENOUS at 20:00

## 2017-11-11 RX ADMIN — AMIODARONE HYDROCHLORIDE 200 MG: 200 TABLET ORAL at 08:41

## 2017-11-11 RX ADMIN — FUROSEMIDE 40 MG: 40 TABLET ORAL at 08:41

## 2017-11-11 RX ADMIN — METOPROLOL TARTRATE 25 MG: 25 TABLET ORAL at 08:41

## 2017-11-11 RX ADMIN — TAMSULOSIN HYDROCHLORIDE 0.4 MG: 0.4 CAPSULE ORAL at 08:41

## 2017-11-11 RX ADMIN — ACETAMINOPHEN 650 MG: 325 TABLET ORAL at 23:19

## 2017-11-11 RX ADMIN — ACETAMINOPHEN 650 MG: 325 TABLET ORAL at 15:48

## 2017-11-11 RX ADMIN — SODIUM CHLORIDE: 9 INJECTION, SOLUTION INTRAVENOUS at 15:53

## 2017-11-11 RX ADMIN — ASPIRIN 81 MG: 81 TABLET, COATED ORAL at 08:41

## 2017-11-11 RX ADMIN — OXYCODONE HYDROCHLORIDE 15 MG: 15 TABLET ORAL at 17:27

## 2017-11-11 RX ADMIN — TAZOBACTAM SODIUM AND PIPERACILLIN SODIUM 3.38 G: 375; 3 INJECTION, SOLUTION INTRAVENOUS at 04:41

## 2017-11-11 RX ADMIN — TAZOBACTAM SODIUM AND PIPERACILLIN SODIUM 3.38 G: 375; 3 INJECTION, SOLUTION INTRAVENOUS at 20:00

## 2017-11-11 RX ADMIN — TAZOBACTAM SODIUM AND PIPERACILLIN SODIUM 3.38 G: 375; 3 INJECTION, SOLUTION INTRAVENOUS at 11:20

## 2017-11-11 ASSESSMENT — PAIN SCALES - GENERAL
PAINLEVEL_OUTOF10: 4
PAINLEVEL_OUTOF10: 8
PAINLEVEL_OUTOF10: 9
PAINLEVEL_OUTOF10: 9
PAINLEVEL_OUTOF10: 4

## 2017-11-11 ASSESSMENT — PAIN DESCRIPTION - PAIN TYPE: TYPE: CHRONIC PAIN

## 2017-11-11 NOTE — H&P
36.9 11/10/2017    MCV 82.1 11/10/2017    MCH 26.8 11/10/2017    MCHC 32.6 11/10/2017    RDW 16.8 11/10/2017     11/10/2017     09/07/2011    MPV 7.7 11/10/2017     CMP:    Lab Results   Component Value Date     11/10/2017    K 4.1 11/10/2017    CL 91 11/10/2017    CO2 26 11/10/2017    BUN 12 11/10/2017    CREATININE 0.79 11/10/2017    GFRAA >60 11/10/2017    LABGLOM >60 11/10/2017    GLUCOSE 102 11/10/2017    GLUCOSE 92 09/07/2011    PROT 7.0 10/04/2017    LABALBU 3.4 10/04/2017    CALCIUM 9.0 11/10/2017    BILITOT 3.93 10/04/2017    ALKPHOS 236 10/04/2017    AST 69 10/04/2017    ALT 48 10/04/2017         ASSESSMENT:    Stage IV colon cancer with liver  Cachexia and severe malnutrition  Leukocytosis    chest wall infection  Toe traumatic Hyponatremic    Patient Active Problem List   Diagnosis    Acute systolic congestive heart failure (HCC)    Elevated troponin    Anemia associated with chemotherapy    Carcinoma of colon metastatic to liver (HCC)    Paroxysmal atrial fibrillation (HCC)    Hyperglycemia    Pulmonary HTN    Nonrheumatic aortic valve insufficiency    Hypokalemia    Hyponatremia    Urinary tract infection without hematuria    Moderate malnutrition (HCC)    Normocytic anemia    Elevated LFTs    Carcinoma of colon metastatic to liver [facility])       PLAN:    Admitted cultures and IV antibiotics we'll check liver function tests may need CT of the abdomen will ask oncology to see urology to see physical therapy occupational therapy DVT prophylaxis protein supplements IV fluids see orders          Nikunj Godinez MD  2:32 PM  11/11/2017

## 2017-11-11 NOTE — PLAN OF CARE
Problem: Falls - Risk of  Goal: Absence of falls  Outcome: Ongoing  Patient free from falls this shift. Call light within reach. Side rails up x2. Bed alarm on. Non skid slippers available. Problem: Risk for Impaired Skin Integrity  Goal: Tissue integrity - skin and mucous membranes  Structural intactness and normal physiological function of skin and  mucous membranes. Outcome: Ongoing  Patient has history of skin cancer. Open wound on chest with yellow drainage. Covered with non adherent and 4x4 dressing. Wound and ostomy eval and treat ordered.

## 2017-11-11 NOTE — FLOWSHEET NOTE
11/11/17 1652   Provider Notification   Reason for Communication New orders   Provider Name Dr. Fransisca Kirk   Provider Notification Physician   Method of Communication Call   Response See orders   Notification Time [phone]     Reviewed and ordered home medication. See order.

## 2017-11-11 NOTE — CONSULTS
Inpatient consult to Infectious Diseases  Consult performed by: Lyssa Mooney ordered by: Delores Verde          [facility]  Initial Consult Note  Today's Date and Time: 11/11/2017, 6:55 PM    Impression:   · Leukocytosis without evidence of acute infectionoverall improved. · Stage IV colon cancer  · Left eye extropion with no acute infection  · History of prostate cancer  · Severe protein calorie malnutrition/debility/failure to thrive. · Skin cancer on the anterior chest status post resection and has a open ulceration not currently infected. · Left 2nd toe wound secondary to trauma with osteomyelitis. · COPD/obstructive sleep apnea    Recommendations   · This point in time there is no acute infectious process and I will hold off antimicrobial therapy  · Continue local wound care to the anterior chest wound. · The left foot 2nd toe traumatic ulceration has caused bone exposure and I would favor continued conservative management. · Again the patient does have overall debility but does not appear acutely infected and I will follow his progress off antimicrobial therapy. Chief complaint/reason for consultation:   Leukocytosis, infected wound    History of Present Illness:   Charlotte Chin is a 80y.o.-year-old male who was initially admitted on 11/10/2017. Raleigh Encinas lives at home with his son and does have a history of colon cancer. The patient was found unresponsive by the son and in general has been fatigued and cachectic. 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 The patient himself does not really have any major complaints he does not report any fevers or chills. He does not have any cough or significant shortness of breath. No abdominal pain nausea vomiting no diarrhea or constipation.   The patient does have the chest wound and is concern for infection I was asked to evaluate and help with antibiotic choice. I have personally reviewed the past medical history, past surgical history, medications, social history, and family history, and I have updated the database accordingly. Past Medical History:     Past Medical History:   Diagnosis Date    Colon cancer ([facility] [address]     Congestive heart failure (CHF) (HCC)     COPD (chronic obstructive pulmonary disease) (HCC)     patient unaware    History of atrial fibrillation 9-    Hyperlipidemia     Hypertension     Obstructive sleep apnea     no machine    Prostate cancer (HCC)      Past Surgical  History:     Past Surgical History:   Procedure Laterality Date    CYSTOSCOPY  11-20-15    with stent removal and re-insertion    SIGMOIDECTOMY  9-2015    SIGMOID RESECTION FOR CARCINOMA     Medications:      [START ON 11/12/2017] enoxaparin  30 mg Subcutaneous Daily    oxyCODONE  15 mg Oral Daily    sodium chloride flush  10 mL Intravenous 2 times per day    piperacillin-tazobactam  3.375 g Intravenous Q8H    amiodarone  200 mg Oral Daily    aspirin  81 mg Oral Daily    furosemide  40 mg Oral Daily    metoprolol tartrate  25 mg Oral Daily    tamsulosin  0.4 mg Oral Daily     Social History:     Social History     Social History    Marital status:      Spouse name: N/A    Number of children: N/A    Years of education: N/A     Occupational History    Not on file. Social History Main Topics    Smoking status: Former Smoker     Quit date: 5/10/1965    Smokeless tobacco: Never Used    Alcohol use No    Drug use: No    Sexual activity: Not on file     Other Topics Concern    Not on file     Social History Narrative    No narrative on file     Family History:   History reviewed. No pertinent family history. Allergies:   Clonidine derivatives and Digoxin and related     Review of Systems:   General: No fevers or chills. Eyes: No double vision or blurry vision.   ENT: No sore throat or runny nose. Cardiovascular: No chest pain or palpitations. Lung: No shortness of breath or cough. Abdomen: No nausea, vomiting, diarrhea, or abdominal pain. Genitourinary: No increased urinary frequency, or dysuria. Musculoskeletal: No muscle aches or pains. Hematologic: No bleeding or bruising. Neurologic: No headache, weakness, numbness, or tingling. Physical Examination :   BP (!) 140/49   Pulse 63   Temp 98.1 °F (36.7 °C) (Oral)   Resp 16   Ht 5' 11\" (1.803 m)   Wt 130 lb (59 kg)   SpO2 97%   BMI 18.13 kg/m²     Temperature Range: Temp: 98.1 °F (36.7 °C) Temp  Av.1 °F (36.7 °C)  Min: 98.1 °F (36.7 °C)  Max: 98.1 °F (36.7 °C)  General Appearance: Awake, alert, and in no apparent distress  Head: Normocephalic, without obvious abnormality, atraumatic  Eyes: pupils equal, round, and reactive to light, extraocular eye movements intact and There is extropin in the left eye    ENT: Oropharynx clear, without erythema, exudate, or thrush. Neck: Supple, without lymphadenopathy. Pulmonary/Chest: Clear to auscultation, without wheezes, rales, or rhonchi  Cardiovascular: Regular rate and rhythm without murmurs, rubs, or gallops. Abdomen: Soft, nontender, nondistended. Extremities: No cyanosis, clubbing, edema, or effusions. Neurologic: No gross sensory or motor deficits. Skin: ecchymoses noted on the upper extremities bilaterally, the scalp has multiple dry exudative ascitic lesions , the anterior chest is another exophytic lesion as well as a superficial ulceration which is not currently infected. There is a left foot 2nd toe ulceration with exposed bone in the wound bed. Otherwise the rest of the skin warm and dry with no rash.         Medical Decision Making:   I have independently reviewed/ordered the following labs:  CBC with Differential:   Recent Labs      11/10/17   1015  17   1446   WBC  18.9*  14.2*   HGB  12.1*  11.2*   HCT  36.9*  34.8*   PLT  241  217   LYMPHOPCT  4 7   MONOPCT  11  9     BMP:   Recent Labs      11/10/17   1015  11/11/17   1446   NA  130*  131*   K  4.1  3.6*   CL  91*  92*   CO2  26  23   BUN  12  16   CREATININE  0.79  0.77     Hepatic Function Panel:   Recent Labs      11/11/17   1446   PROT  6.5   LABALBU  2.5*   BILIDIR  0.53*   IBILI  0.53   BILITOT  1.06   ALKPHOS  126   ALT  28   AST  36     No results found for: CRP  No results found for: SEDRATE    Imaging Studies:   TWO VIEWS OF THE CHEST 11/10/2017 10:28 am  Impression   No acute cardiopulmonary disease.  Left-sided port. Cultures:     Urine Culture [[id number]] Collected: 11/10/17 1159   Order Status: Completed Specimen: Urine, clean catch Updated: 11/11/17 1632    Specimen Description . CLEAN CATCH URINE Performed at [address] [place] [address]    Specimen Description [address] [phone]    Special Requests NOT REPORTED    Culture NO GROWTH    Culture Performed at [address] [phone]    Status FINAL 11/11/2017   Culture Blood #1 [[id number]] Collected: 11/10/17 1028   Order Status: Completed Specimen: Blood Updated: 11/11/17 1450    Specimen Description . BLOOD 6ML RT AC Performed at [address] [place] [address]    Specimen Description [address] [phone]    Special Requests NOT REPORTED    Culture NO GROWTH 1 DAY    Culture Performed at [address] [phone]    Status Pending   Culture Blood #1 [[id number]] Collected: 11/10/17 1040   Order Status: Completed Specimen: Blood Updated: 11/11/17 1450    Specimen Description . BLOOD 3ML RT HAND Performed at [address] [place] [address]    Specimen Description [address] [phone]    Special Requests NOT REPORTED    Culture NO GROWTH 1 DAY    Culture Performed at [address] [phone]    Status Pending         Thank you for allowing us to participate in the care of this patient. Please call with questions.     Catracho Soto MD  [employer] messaging: [phone]

## 2017-11-11 NOTE — CARE COORDINATION
Case Management Initial Discharge Plan  Bob Keller,         Readmission Risk              Readmission Risk:        24.75       Age 72 or Greater:  1    Admitted from SNF or Requires Paid or Family Care:  0    Currently has CHF,COPD,ARF,CRI,or is on dialysis:  4    Takes more than 5 Prescription Medications:  4    Takes Digoxin,Insulin,Anticoagulants,Narcotics or ASA/Plavix:  [address] in Past 12 Months:  10    On Disability:  0    Patient Considers own Health:  3.75            Met with:family member patient and son Randi Barrera to discuss discharge plans.    Information verified: address, contacts, phone number, , insurance Yes  PCP: Jumana Johansen MD  Date of last visit:  2017    Insurance Provider:  Mikaela Calvin and [facility]    Discharge Planning  Current Residence:   split level home  Living Arrangements:  Children, Family Members   Home has 1 stories/4 stairs to climb  Support Systems:  Children, Family Members  Current Services PTA:   none Agency:  none  Patient able to perform ADL's:Assisted  DME in home:  Shower chair, 4 pt cane, walker  DME used to aid ambulation prior to admission:    cane  DME used during admission:   Unknown at assessment    Potential Assistance Needed:  N/A    Pharmacy:  [address] [facility] Medications:  No  Does patient want to participate in local refill/ meds to beds program?  No    Patient agreeable to home care: No  [place] of choice provided:  yes      Type of Home Care Services:  Nursing Services  Patient expects to be discharged to:  home    Prior SNF/Rehab Placement and Facility:  [facility]  Agreeable to SNF/Rehab: potential  [place] of choice provided: yes   Evaluation: no    Expected Discharge date:  17  Follow Up Appointment: Best Day/ Time:      Transportation provider:  Dung Bell  Transportation arrangements needed for discharge: POTENTIAL    Discharge Plan: Met with pt myra Barrera at bedside. Pt sleeping. Pt and Karthik Brooks live together. Karthik Brooks has been pt's caregiver for last 2.5 years. Pt has been able to ambulate in his home with use of cane. Pt has been independent with ADLs. Had chemo treatment 11/6 at [facility] on [address] [facility] for stage 4 colon cancer. Karthik Brooks states pt has had recurrent UTI infections. Pt went to [facility] in April as result of one. Karthik Brooks pt is full code. Karthik Brooks is realistic of disease process but states pt has always wanted everything done. Karthik Brooks states he see pt decline over last few months. Encouraged to talk with his father about his wishes for resuscitation. Pt began services with [facility], last visit 11/10 prior to admission. Karthik Brooks wants to continue with services. Discussed potential discharge needs. Karthik Brooks is hoping to bring pt home. Agreeable to referral to [facility]. Referral e faxed to Javi Rodarte to follow. If pt needs rehab Karthik Brooks would want him to go to [facility]. Await PT/OT edwina and MD POC. GREGOR initiated.          Electronically signed by Corrina Torres RN on 11/11/17 at 11:22 AM

## 2017-11-11 NOTE — PLAN OF CARE
Problem: Falls - Risk of  Goal: Absence of falls  Outcome: Met This Shift  Siderails up x 2  Hourly rounding. Call light in reach. Instructed to call for assist before attempting out of bed. Remains free from falls and accidental injury at this time. Floor free from obstacles, and bed is locked and in lowest position. Adequate lighting provided. Problem: Risk for Impaired Skin Integrity  Goal: Tissue integrity - skin and mucous membranes  Structural intactness and normal physiological function of skin and  mucous membranes. Outcome: Ongoing  Checked for incontinence every 2 hours and prn. Pericare as needed. Assisted to reposition off back frequently. On waffle mattress. Heels off bed with pillows.

## 2017-11-12 PROBLEM — N39.0 URINARY TRACT INFECTION WITHOUT HEMATURIA: Status: RESOLVED | Noted: 2017-10-04 | Resolved: 2017-11-12

## 2017-11-12 PROBLEM — R79.89 ELEVATED LFTS: Status: RESOLVED | Noted: 2017-10-04 | Resolved: 2017-11-12

## 2017-11-12 PROCEDURE — 1200000000 HC SEMI PRIVATE

## 2017-11-12 PROCEDURE — 99232 SBSQ HOSP IP/OBS MODERATE 35: CPT | Performed by: INTERNAL MEDICINE

## 2017-11-12 PROCEDURE — 2500000003 HC RX 250 WO HCPCS

## 2017-11-12 PROCEDURE — 6370000000 HC RX 637 (ALT 250 FOR IP): Performed by: INTERNAL MEDICINE

## 2017-11-12 RX ORDER — POTASSIUM CHLORIDE 750 MG/1
20 CAPSULE, EXTENDED RELEASE ORAL ONCE
Status: COMPLETED | OUTPATIENT
Start: 2017-11-12 | End: 2017-11-12

## 2017-11-12 RX ADMIN — TAZOBACTAM SODIUM AND PIPERACILLIN SODIUM 3.38 G: 375; 3 INJECTION, SOLUTION INTRAVENOUS at 11:14

## 2017-11-12 RX ADMIN — ACETAMINOPHEN 650 MG: 325 TABLET ORAL at 20:48

## 2017-11-12 RX ADMIN — AMIODARONE HYDROCHLORIDE 200 MG: 200 TABLET ORAL at 07:26

## 2017-11-12 RX ADMIN — TAMSULOSIN HYDROCHLORIDE 0.4 MG: 0.4 CAPSULE ORAL at 07:26

## 2017-11-12 RX ADMIN — METOPROLOL TARTRATE 25 MG: 25 TABLET ORAL at 07:25

## 2017-11-12 RX ADMIN — FUROSEMIDE 40 MG: 40 TABLET ORAL at 07:26

## 2017-11-12 RX ADMIN — ASPIRIN 81 MG: 81 TABLET, COATED ORAL at 07:26

## 2017-11-12 RX ADMIN — OXYCODONE HYDROCHLORIDE 15 MG: 15 TABLET ORAL at 17:41

## 2017-11-12 RX ADMIN — TAZOBACTAM SODIUM AND PIPERACILLIN SODIUM 3.38 G: 375; 3 INJECTION, SOLUTION INTRAVENOUS at 04:15

## 2017-11-12 RX ADMIN — POTASSIUM CHLORIDE 20 MEQ: 750 CAPSULE, EXTENDED RELEASE ORAL at 13:13

## 2017-11-12 ASSESSMENT — PAIN DESCRIPTION - PAIN TYPE
TYPE: CHRONIC PAIN
TYPE: ACUTE PAIN

## 2017-11-12 ASSESSMENT — PAIN SCALES - GENERAL
PAINLEVEL_OUTOF10: 5
PAINLEVEL_OUTOF10: 10
PAINLEVEL_OUTOF10: 1

## 2017-11-12 NOTE — PLAN OF CARE
Problem: Nutrition  Goal: Optimal nutrition therapy  Nutrition Problem: Increased nutrient needs  Intervention: Food and/or Nutrient Delivery: Continue current diet, Start ONS  Nutritional Goals: PO intake > 75% of estimated kcal/protein needs with good GI tolerance    Outcome: Ongoing  Nutrition Problem: Increased nutrient needs  Intervention: Food and/or Nutrient Delivery: Continue current diet, Start ONS  Nutritional Goals: PO intake > 75% of estimated kcal/protein needs with good GI tolerance

## 2017-11-12 NOTE — PROGRESS NOTES
Body Wt: 130 lb 1.1 oz (59 kg)  · Ideal Body Wt: 172 lb (78 kg), % Ideal Body 76%  · BMI Classification: BMI <18.5 Underweight  · Comparative Standards (Estimated Nutrition Needs):  · Estimated Daily Total Kcal: 3265-6767  · Estimated Daily Protein (g): 70-85  · Estimated Daily Fluids (mL): 0963-5164    Estimated Intake vs Estimated Needs: Intake Less Than Needs    Nutrition Risk Level: High    Nutrition Interventions:   Continue current diet, Start ONS  Continued Inpatient Monitoring, Coordination of Care    Nutrition Evaluation:   · Evaluation: Goals set   · Goals: PO intake > 75% of estimated kcal/protein needs with good GI tolerance    · Monitoring: Meal Intake, Supplement Intake, Diet Tolerance, Skin Integrity, Wound Healing, Mental Status/Confusion, Weight, Pertinent Labs, Chewing/Swallowing, Constipation    See Adult Nutrition Doc Flowsheet for more detail.      Electronically signed by Jay GUERIN, RDCAITY, LD on 11/12/2017 at 5:10 PM    Contact Number:  [phone]

## 2017-11-12 NOTE — PROGRESS NOTES
input(s): INR in the last 72 hours. Objective:   Vitals: BP (!) 146/54   Pulse 59   Temp 97.7 °F (36.5 °C) (Oral)   Resp 18   Ht 5' 11\" (1.803 m)   Wt 130 lb (59 kg)   SpO2 99%   BMI 18.13 kg/m²   General appearance: alert and cooperative with exam.  Patient with marked debilitation and cachexia. HEENT: Head: Normocephalic, no lesions, Multiple skin cancers are noted across the cranium. Neck: no adenopathy  Lungs: clear to auscultation bilaterally diminished breath sounds bilaterally. Heart: regular rate and rhythm, S1, S2 normal, no murmur, click, rub or gallop  Abdomen: soft, non-tender; bowel sounds normal; no masses, hepatomegaly is identified for finger breaths below the right costal margin. Extremities: extremities normal, atraumatic, no cyanosis or edema  Neurologic: Mental status: Alert, oriented, thought content appropriate  Skin: Multiple areas of wound infection are noted in the areas of cellulitis. Multiple skin cancers are identified. Assessment and Plan: Active Problems:    Leukocytosis    Failure to thrive syndrome, adult    Draining postoperative wound    Subacute osteomyelitis of left foot (HCC)  Metastatic colon cancer. Marked debilitated state. Impression:  Leukocytosis secondary to metastatic cancer and skin infections. Stage IV colon cancer  Marked debilitated state poor performance status and cachexia. Left eye extropion  Left second toe wound is either due to trauma with osteomyelitis. Multiple skin cancers. ID does not think the present wound is showing the chest is infected. Regulations:  #1 patient requires massive amounts of supportive care. #2 the patient wishes to continue chemotherapy suspect this is also not in the patient's best advantage. Patient's performance status severely quite poor likelihood of meaningful response with chemotherapy is limited.         Manjit Vinson MD

## 2017-11-12 NOTE — PROGRESS NOTES
[facility]  Progress Note    Stephie Escoto  MRN: [id number]  Today's Date and Time: 2017, 5:35 PM    Impression :   · Skin cancer on the anterior chest status post resection and has a open ulceration not currently infected. · Left 2nd toe wound secondary to trauma with osteomyelitis. · Leukocytosis without evidence of acute infectionoverall improved. · Stage IV colon cancer  · Left eye extropion with no acute infection  · History of prostate cancer  · Severe protein calorie malnutrition/debility/failure to thrive. · COPD/obstructive sleep apnea    Recommendations:   · Continue off antimicrobial therapy. · Continue local wound care for the multiple superficial ulcers. · The care was discussed with the patient, as well as her son at bedside today. · The care was also discussed with Dr. Marilee Kc earlier today. · Plans for discharge tomorrow. Summary of Stay:   Stephie Escoto is a 80y.o.-year-old male who was initially admitted on 11/10/2017. Bev Villela lives at home with his son and does have a history of colon cancer. The patient was found unresponsive by the son and in general has been fatigued and cachectic. 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 The patient himself does not really have any major complaints he does not report any fevers or chills. He does not have any cough or significant shortness of breath. No abdominal pain nausea vomiting no diarrhea or constipation. Current evaluation:2017    BP (!) 146/54   Pulse 59   Temp 97.7 °F (36.5 °C) (Oral)   Resp 18   Ht 5' 11\" (1.803 m)   Wt 130 lb (59 kg)   SpO2 99%   BMI 18.13 kg/m²     Temperature Range: Temp: 97.7 °F (36.5 °C) Temp  Av.8 °F (36.6 °C)  Min: 97.7 °F (36.5 °C)  Max: 97.9 °F (36.6 °C)      Physical Examination :     Physical Exam   Constitutional: He appears cachectic. Performed at [address] [place] [address] [facility]    Specimen Description [place] [facility] [phone]    Special Requests NOT REPORTED    Culture NO GROWTH 2 DAYS    Culture Performed at [facility] [id number] [place] [address], [address] [place] [address] [phone]    Status Pending   Urine Culture [[id number]] Collected: 11/10/17 1159   Order Status: Completed Specimen: Urine, clean catch Updated: 11/11/17 1639    Specimen Description . CLEAN CATCH URINE Performed at [address] [place] [address] [address] [facility]    Specimen Description [place] [address] [facility] [phone]    Special Requests NOT REPORTED    Culture NO GROWTH    Culture Performed at [address], [address] [place] [address] [phone]    Status FINAL 11/11/2017         Medications:      enoxaparin  30 mg Subcutaneous Daily    oxyCODONE  15 mg Oral Daily    sodium chloride flush  10 mL Intravenous 2 times per day    piperacillin-tazobactam  3.375 g Intravenous Q8H    amiodarone  200 mg Oral Daily    aspirin  81 mg Oral Daily    furosemide  40 mg Oral Daily    metoprolol tartrate  25 mg Oral Daily    tamsulosin  0.4 mg Oral Daily     Thank you for allowing us to participate in the care of this patient. Please call with questions.     Clemencia Bella MD  [employer] messaging: [phone]

## 2017-11-12 NOTE — PROGRESS NOTES
Follow-up malnutrition  No better she didn't level bit better discussed with the patient son is still wants full code still with unsteady gait  Review of system  No fever or chills no GI/ complaints no cardiopulmonary complaints, no TIA no bleeding, no polyuria nor polydipsia no hypoglycemia, no headaches no sore throat  Physical exam vitals stable  Eyes no pallor or jaundice  Neck supple no JVD  Lungs air entry equal decrease at the bases no crackles no use of intercostals  Heart regular rate and rhythm no gallop  Abdomen soft plus bowel sounds without any tenderness  Extremities good pulses without edema and no Homans sign  Neuro alert and oriented ×3 with no focal deficit, unsteady gait  Assessment and plan  Leukocytosis getting better so far no signs of any infection  Open wound chest wall from resected skin cancer no infection  Traumatic ulcer left second toe with subacute osteomyelitis possible  Severe malnutrition and protein supplements  Hyponatremia  Hypokalemia  Meds labs reviewed continue IV fluids DVT prophylaxis physical therapy occupational therapy, add K supplement see orders

## 2017-11-12 NOTE — CONSULTS
Department of Urology  Urology Consult Note    Patient:  Candy Lombard  MRN: [id number]  YOB: 1934    Reason for Consult:  dysuria  Requesting Physician:  Dr. Anne Hawkins:    Chief Complaint   Patient presents with    Fatigue       History Obtained From:   patient    HISTORY OF PRESENT ILLNESS:    The patient is a 80 y.o. male with significant past medical history of bph who presents with cellulitis. Was complaining of some dysuria yesterday. He states that this happens intermittently but that he is not bothered by this symptom. Also states that he sometimes feel that he does not empty his bladder. No hesitancy. Has reasonable stream.  Denies any bothersome symptoms. Has been on tamsulosin for years and states that it helps him considerably.       Past Medical History:        Diagnosis Date    [facility] ([facility] [address] [facility]     Congestive heart failure (CHF) (HCC)     COPD (chronic obstructive pulmonary disease) (HCC)     patient unaware    History of atrial fibrillation 9-    Hyperlipidemia     Hypertension     Obstructive sleep apnea     no machine    Prostate cancer [facility])      Past Surgical History:        Procedure Laterality Date    CYSTOSCOPY  11-20-15    with stent removal and re-insertion    SIGMOIDECTOMY  9-2015    SIGMOID RESECTION FOR CARCINOMA     Current Medications:   Current Facility-Administered Medications: enoxaparin (LOVENOX) injection 30 mg, 30 mg, Subcutaneous, Daily  0.9 % sodium chloride infusion, , Intravenous, Continuous  oxyCODONE (OXY-IR) immediate release tablet 15 mg, 15 mg, Oral, Daily  sodium chloride flush 0.9 % injection 10 mL, 10 mL, Intravenous, 2 times per day  sodium chloride flush 0.9 % injection 10 mL, 10 mL, Intravenous, PRN  piperacillin-tazobactam (ZOSYN) 3.375 g in dextrose 50 mL IVPB extended infusion (premix), 3.375 g, Intravenous, Q8H  acetaminophen (TYLENOL) tablet 650 mg, 650 mg, Oral, Q4H PRN  amiodarone (CORDARONE) tablet 200 mg, 200 mg, Oral, Daily  aspirin EC tablet 81 mg, 81 mg, Oral, Daily  furosemide (LASIX) tablet 40 mg, 40 mg, Oral, Daily  metoprolol tartrate (LOPRESSOR) tablet 25 mg, 25 mg, Oral, Daily  tamsulosin (FLOMAX) capsule 0.4 mg, 0.4 mg, Oral, Daily    Allergies: ALG@    Social History:   Social History     Social History    Marital status:      Spouse name: N/A    Number of children: N/A    Years of education: N/A     Occupational History    Not on file. Social History Main Topics    Smoking status: Former Smoker     Quit date: 5/10/1965    Smokeless tobacco: Never Used    Alcohol use No    Drug use: No    Sexual activity: Not on file     Other Topics Concern    Not on file     Social History Narrative    No narrative on file       Family History:   History reviewed. No pertinent family history. Review of Systems:  Constitutional: Negative for fever, chills and activity change. Eyes: Negative for pain, redness and visual disturbance. Respiratory: Negative for cough, shortness of breath and wheezing. Cardiovascular: Negative for chest pain and leg swelling. Gastrointestinal: Negative for nausea, vomiting and abdominal pain. Endocrine: Negative for polydipsia and polyphagia. Genitourinary: Negative for dysuria, frequency, hematuria, flank pain and difficulty urinating. Musculoskeletal: Negative for myalgias, back pain and joint swelling. Skin: Negative for color change, rash and wound. Allergic/Immunologic: Negative for environmental allergies and food allergies. Neurological: Negative for dizziness, tremors and numbness. Hematological: Negative for adenopathy. Does not bruise/bleed easily. Psychiatric/Behavioral: Negative for confusion and dysphoric mood. The patient is not nervous/anxious.        Patient Vitals for the past 24 hrs:   BP Temp Temp src Pulse Resp SpO2   11/11/17 1927 (!) 116/45 97.9 °F (36.6 °C) Oral 58 16 96 %   11/11/17 0706 (!) 140/49 98.1 °F (36.7 right atrium. Overlying ECG monitor leads. Cardiac silhouette upper limits of normal but stable. Mediastinal structures appropriate for slight rotation to the left, and unchanged Prominent but unchanged markings. Small basilar nodular densities corresponding to CT findings. No localized pulmonary opacity suspicious for infiltrate or edema, and no blunting of the costophrenic angles. Mild apical pleural thickening, unchanged. DJD spine and left shoulder, but bones and soft tissues appear unchanged and intact. No acute cardiopulmonary disease. Left-sided port. 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 No changes planned as pt is very satisfied with how he is voiding. Continue tamsulosin.       Electronically signed by Fili Muniz MD on 11/12/2017 at 6:56 AM

## 2017-11-12 NOTE — PLAN OF CARE
Problem: Falls - Risk of  Goal: Absence of falls  Outcome: Ongoing  Room free of clutter  Hourly rounding   Non-skid socks worn  Side rails up x2  Bed low and locked  Call light in reach  Instructed to call out before getting out of bed  Anticipatory needs met  Bed alarm on  Falling star at the door and on wristband      Problem: Risk for Impaired Skin Integrity  Goal: Tissue integrity - skin and mucous membranes  Structural intactness and normal physiological function of skin and  mucous membranes.    Outcome: Ongoing  Skin assessment completed and documented  Amos scale updated  Relieve pressure to bony prominences  Avoid shearing  Assess s/sx of infection   Air mattress in place  Turns side to side  Bette care given and barrier cream applied to prevent breakdown  Heels elevated  Structural intactness and normal physiological function of skin and mucous membranes      Problem: ACTIVITY INTOLERANCE/IMPAIRED MOBILITY  Goal: Mobility/activity is maintained at optimum level for patient  Outcome: Ongoing  Encourage periods of rest  Evaluate limitations/barriers to activity  Implement activity progression plan

## 2017-11-13 VITALS
OXYGEN SATURATION: 98 % | BODY MASS INDEX: 18.2 KG/M2 | HEIGHT: 71 IN | RESPIRATION RATE: 18 BRPM | DIASTOLIC BLOOD PRESSURE: 63 MMHG | HEART RATE: 72 BPM | SYSTOLIC BLOOD PRESSURE: 160 MMHG | TEMPERATURE: 98 F | WEIGHT: 130 LBS

## 2017-11-13 LAB
ABSOLUTE EOS #: 0 K/UL (ref 0–0.4)
ABSOLUTE IMMATURE GRANULOCYTE: ABNORMAL K/UL (ref 0–0.3)
ABSOLUTE LYMPH #: 0.8 K/UL (ref 1–4.8)
ABSOLUTE MONO #: 0.9 K/UL (ref 0.2–0.8)
ANION GAP SERPL CALCULATED.3IONS-SCNC: 10 MMOL/L (ref 9–17)
BASOPHILS # BLD: 0 %
BASOPHILS ABSOLUTE: 0 K/UL (ref 0–0.2)
BUN BLDV-MCNC: 11 MG/DL (ref 8–23)
BUN/CREAT BLD: 18 (ref 9–20)
CALCIUM SERPL-MCNC: 8.6 MG/DL (ref 8.6–10.4)
CHLORIDE BLD-SCNC: 97 MMOL/L (ref 98–107)
CO2: 28 MMOL/L (ref 20–31)
CREAT SERPL-MCNC: 0.62 MG/DL (ref 0.7–1.2)
DIFFERENTIAL TYPE: ABNORMAL
EOSINOPHILS RELATIVE PERCENT: 1 %
GFR AFRICAN AMERICAN: >60 ML/MIN
GFR NON-AFRICAN AMERICAN: >60 ML/MIN
GFR SERPL CREATININE-BSD FRML MDRD: ABNORMAL ML/MIN/{1.73_M2}
GFR SERPL CREATININE-BSD FRML MDRD: ABNORMAL ML/MIN/{1.73_M2}
GLUCOSE BLD-MCNC: 89 MG/DL (ref 70–99)
HCT VFR BLD CALC: 35 % (ref 41–53)
HEMOGLOBIN: 11.3 G/DL (ref 13.5–17.5)
IMMATURE GRANULOCYTES: ABNORMAL %
LYMPHOCYTES # BLD: 10 %
MCH RBC QN AUTO: 26.8 PG (ref 26–34)
MCHC RBC AUTO-ENTMCNC: 32.3 G/DL (ref 31–37)
MCV RBC AUTO: 83.1 FL (ref 80–100)
MONOCYTES # BLD: 11 %
PDW BLD-RTO: 16.5 % (ref 11.5–14.5)
PLATELET # BLD: 239 K/UL (ref 130–400)
PLATELET ESTIMATE: ABNORMAL
PMV BLD AUTO: 7.6 FL (ref 6–12)
POTASSIUM SERPL-SCNC: 4 MMOL/L (ref 3.7–5.3)
RBC # BLD: 4.22 M/UL (ref 4.5–5.9)
RBC # BLD: ABNORMAL 10*6/UL
SEG NEUTROPHILS: 78 %
SEGMENTED NEUTROPHILS ABSOLUTE COUNT: 6.2 K/UL (ref 1.8–7.7)
SODIUM BLD-SCNC: 135 MMOL/L (ref 135–144)
WBC # BLD: 7.9 K/UL (ref 3.5–11)
WBC # BLD: ABNORMAL 10*3/UL

## 2017-11-13 PROCEDURE — 97535 SELF CARE MNGMENT TRAINING: CPT

## 2017-11-13 PROCEDURE — G8978 MOBILITY CURRENT STATUS: HCPCS

## 2017-11-13 PROCEDURE — 6370000000 HC RX 637 (ALT 250 FOR IP): Performed by: INTERNAL MEDICINE

## 2017-11-13 PROCEDURE — 80048 BASIC METABOLIC PNL TOTAL CA: CPT

## 2017-11-13 PROCEDURE — G8979 MOBILITY GOAL STATUS: HCPCS

## 2017-11-13 PROCEDURE — 97165 OT EVAL LOW COMPLEX 30 MIN: CPT

## 2017-11-13 PROCEDURE — 99232 SBSQ HOSP IP/OBS MODERATE 35: CPT | Performed by: INTERNAL MEDICINE

## 2017-11-13 PROCEDURE — 85025 COMPLETE CBC W/AUTO DIFF WBC: CPT

## 2017-11-13 PROCEDURE — 2580000003 HC RX 258: Performed by: INTERNAL MEDICINE

## 2017-11-13 PROCEDURE — 97530 THERAPEUTIC ACTIVITIES: CPT

## 2017-11-13 PROCEDURE — 36415 COLL VENOUS BLD VENIPUNCTURE: CPT

## 2017-11-13 PROCEDURE — 6360000002 HC RX W HCPCS: Performed by: INTERNAL MEDICINE

## 2017-11-13 PROCEDURE — 97162 PT EVAL MOD COMPLEX 30 MIN: CPT

## 2017-11-13 PROCEDURE — 97116 GAIT TRAINING THERAPY: CPT

## 2017-11-13 RX ADMIN — AMIODARONE HYDROCHLORIDE 200 MG: 200 TABLET ORAL at 09:22

## 2017-11-13 RX ADMIN — OXYCODONE HYDROCHLORIDE 15 MG: 15 TABLET ORAL at 16:56

## 2017-11-13 RX ADMIN — FUROSEMIDE 40 MG: 40 TABLET ORAL at 09:23

## 2017-11-13 RX ADMIN — METOPROLOL TARTRATE 25 MG: 25 TABLET ORAL at 09:22

## 2017-11-13 RX ADMIN — ASPIRIN 81 MG: 81 TABLET, COATED ORAL at 09:22

## 2017-11-13 RX ADMIN — TAMSULOSIN HYDROCHLORIDE 0.4 MG: 0.4 CAPSULE ORAL at 09:22

## 2017-11-13 RX ADMIN — SODIUM CHLORIDE: 9 INJECTION, SOLUTION INTRAVENOUS at 05:41

## 2017-11-13 ASSESSMENT — PAIN SCALES - GENERAL
PAINLEVEL_OUTOF10: 4
PAINLEVEL_OUTOF10: 6

## 2017-11-13 NOTE — PROGRESS NOTES
Occupational Therapy   Occupational Therapy Initial Assessment  Date: 2017   Patient Name: Sai Escobedo  MRN: [id number]     : 1934    Patient Diagnosis(es): The primary encounter diagnosis was Bacteremia. Diagnoses of General weakness and Cellulitis and abscess of trunk were also pertinent to this visit. has a past medical history of Colon cancer ([facility] Congestive heart failure (CHF) ([facility] COPD (chronic obstructive pulmonary disease) ([facility] History of atrial fibrillation; Hyperlipidemia; Hypertension; Obstructive sleep apnea; and Prostate cancer ([facility] has a past surgical history that includes sigmoidectomy () and Cystoscopy (11-20-15).            Restrictions  Restrictions/Precautions  Restrictions/Precautions: Fall Risk, General Precautions  Position Activity Restriction  Other position/activity restrictions: up with assist    Subjective   General  Chart Reviewed: Yes  Patient assessed for rehabilitation services?: Yes  Additional Pertinent Hx:  (Sore on left toe, prior bunion )  Response to previous treatment: Patient reporting fatigue but able to participate  Family / Caregiver Present: No  Pain Assessment  Patient Currently in Pain: Denies  Pain Assessment: 0-10  Pain Level: 1  Pain Type: Acute pain (Headache)  Pain Intervention(s): Medication (see eMar)  Response to Pain Intervention: Patient Satisfied     Social/Functional History  Social/Functional History  Lives With: Son  Type of Home: House  Home Layout: Multi-level (split level, 4 steps to second floor, 6 steps to lowver level rail on left)  Home Access: Stairs to enter with rails  Entrance Stairs - Number of Steps: 1 step  Entrance Stairs - Rails: Left (going up left side)  Bathroom Shower/Tub: Tub/Shower unit  Bathroom Toilet: Standard  Bathroom Equipment: Shower chair, Grab bars in shower, Grab bars around toilet  Home Equipment: Rolling walker, Cane  ADL Assistance: Needs assistance  Homemaking Assistance: Needs assistance  Homemaking Responsibilities: No  Ambulation Assistance: Needs assistance (walker or cane)  Transfer Assistance: Independent  Active : No  Patient's  Info: son  Mode of Transportation: Car  Occupation: Retired  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)  Leisure & Hobbies:  (TV, plays poker, watches sports)       Objective        Orientation  Overall Orientation Status: Within Functional Limits (States he is going home later today, but oriented to day and current events)  Observation/Palpation  Posture: Fair (kyphotic )  Balance  Sitting Balance: Unable to assess(comment) (refused to sit EOB)  Standing Balance: Unable to assess(comment) (Refused to stand at bedside )  ADL  Feeding: Modified independent ; Increased time to complete;Setup;Verbal cueing (complained about food, finally agreed to eat, needed extra time and help with lids)  Grooming: Contact guard assistance  UE Bathing: Minimal assistance  LE Bathing: Moderate assistance  UE Dressing: Minimal assistance  LE Dressing: Moderate assistance  Toileting: Unable to assess(comment) (Pt refused to get out of bed, used  urinal in bed  with Mod Indep w set up )  Tone RUE  RUE Tone: Normotonic  Tone LUE  LUE Tone: Normotonic  Coordination  Movements Are Fluid And Coordinated: No  Coordination and Movement description: Fine motor impairments;Decreased speed  Quality of Movement Other  Comment:  (extra time, several tries to remove lids from food on tray)     Bed mobility  Rolling to Left: Minimal assistance  Rolling to Right: Minimal assistance  Scooting: Minimal assistance  Transfers  Stand Step Transfers:  (Refused to transfer or to get out of bed)     Cognition  Overall Cognitive Status: Exceptions  Following Commands:  Follows one step commands with increased time  Attention Span: Attends with cues to redirect  Memory: Decreased recall of precautions;Decreased short term memory  Safety Judgement: Decreased awareness of need for

## 2017-11-13 NOTE — PROGRESS NOTES
ONCOLOGY NOTE    PCP: Jules Vasquez MD  Referring Provider: No ref.  provider found    PAST MEDICAL HISTORY:      Diagnosis Date    [facility] ([facility]     Congestive heart failure (CHF) ([facility]     COPD (chronic obstructive pulmonary disease) ([facility]     patient unaware    History of atrial fibrillation 9-    Hyperlipidemia     Hypertension     Obstructive sleep apnea     no machine    Prostate cancer ([facility]        PAST SURGICAL HISTORY:      Procedure Laterality Date    CYSTOSCOPY  11-20-15    with stent removal and re-insertion    SIGMOIDECTOMY  9-2015    SIGMOID RESECTION FOR CARCINOMA       CURRENT MEDICATIONS:   Current Facility-Administered Medications   Medication Dose Route Frequency Provider Last Rate Last Dose    enoxaparin (LOVENOX) injection 30 mg  30 mg Subcutaneous Daily Tamica Acosta MD        0.9 % sodium chloride infusion   Intravenous Continuous Tamica Acosta MD 60 mL/hr at 11/13/17 0541      oxyCODONE (OXY-IR) immediate release tablet 15 mg  15 mg Oral Daily Tamica Acosta MD   15 mg at 11/12/17 1741    sodium chloride flush 0.9 % injection 10 mL  10 mL Intravenous 2 times per day Otilio Alonso MD        sodium chloride flush 0.9 % injection 10 mL  10 mL Intravenous PRN Otilio Alonso MD        acetaminophen (TYLENOL) tablet 650 mg  650 mg Oral Q4H PRN Tamica Acosta MD   650 mg at 11/12/17 2048    amiodarone (CORDARONE) tablet 200 mg  200 mg Oral Daily Tamica Acosta MD   200 mg at 11/13/17 9730    aspirin EC tablet 81 mg  81 mg Oral Daily Tamica Acosta MD   81 mg at 11/13/17 8598    furosemide (LASIX) tablet 40 mg  40 mg Oral Daily Tamica Acosta MD   40 mg at 11/13/17 0649    metoprolol tartrate (LOPRESSOR) tablet 25 mg  25 mg Oral Daily Tamica Acosta MD   25 mg at 11/13/17 6730    tamsulosin (FLOMAX) capsule 0.4 mg  0.4 mg Oral Daily Tamica Acosta MD   0.4 mg at 11/13/17 6425       VISIT DIAGNOSIS:  The primary encounter diagnosis was Bacteremia. Diagnoses of General weakness and Cellulitis and abscess of trunk were also pertinent to this visit. STAGING:  No matching staging information was found for the patient. Patient Active Problem List    Diagnosis Date Noted    Leukocytosis     Failure to thrive syndrome, adult     Draining postoperative wound     Subacute osteomyelitis of left foot (HCC)     Severe protein-calorie malnutrition ([facility] 10/04/2017    Normocytic anemia 10/04/2017    [facility]cinoma of colon [facility] liver ([facility] 10/04/2017    Pulmonary HTN 01/09/2016    Nonrheumatic aortic valve insufficiency 01/09/2016    Hypokalemia 01/09/2016    Hyponatremia 01/09/2016    Paroxysmal atrial fibrillation ([facility] 01/07/2016    Anemia associated with chemotherapy 01/06/2016    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 male who is An adult male with several year history of metastatic colon cancer on multiple systemic therapies in the past.  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 He has very poor performance status and poor nutritional status. Since admission he is improved in performance status and wishes to be discharged home soon. PHYSICAL EXAM:   Vitals:    11/13/17 0752   BP: (!) 160/63   Pulse: 72   Resp: 18   Temp: 98 °F (36.7 °C)   SpO2: 98%       Physical Exam clinical examination today does not reveal profound changes. Patient remains weak with poor performance status. The wounds appear to be open but not infected. His lung fields are clear to patient's sensorium is intact. A since abdominal examination soft without cold findings. LABS:   Results for orders placed or performed during the hospital encounter of 11/10/17   Culture Blood #1   Result Value Ref Range    Specimen Description       . BLOOD 6ML RT AC Performed at [facility] [facility] [address] [facility] [facility]    Specimen Description  38 [facility], [facility] [phone]     Special Requests NOT REPORTED     Culture NO GROWTH 3 DAYS     Culture       Performed at [address], [address] [place] [address] [phone]    Status Pending    Culture Blood #1   Result Value Ref Range    Specimen Description       . BLOOD 3ML RT HAND Performed at [address] [facility] [address]    Specimen Description  [address] [phone]     Special Requests NOT REPORTED     Culture NO GROWTH 3 DAYS     Culture       Performed at [address], [address] [place] [address] [phone]    Status Pending    Urine Culture   Result Value Ref Range    Specimen Description       . CLEAN CATCH URINE Performed at [address]    Specimen Description  [address] [phone]     Special Requests NOT REPORTED     Culture NO GROWTH     Culture       Performed at [address] [phone]    Status FINAL 70/37/4745    Basic Metabolic Panel   Result Value Ref Range    Glucose 102 (H) 70 - 99 mg/dL    BUN 12 8 - 23 mg/dL    CREATININE 0.79 0.70 - 1.20 mg/dL    Bun/Cre Ratio 15 9 - 20    Calcium 9.0 8.6 - 10.4 mg/dL    Sodium 130 (L) 135 - 144 mmol/L    Potassium 4.1 3.7 - 5.3 mmol/L    Chloride 91 (L) 98 - 107 mmol/L    CO2 26 20 - 31 mmol/L    Anion Gap 13 9 - 17 mmol/L    GFR Non-African American >60 >60 mL/min    GFR African American >60 >60 mL/min    GFR Comment          GFR Staging NOT REPORTED    CBC Auto Differential   Result Value Ref Range    WBC 18.9 (H) 3.5 - 11.0 k/uL    RBC 4.50 4.5 - 5.9 m/uL    Hemoglobin 12.1 (L) 13.5 - 17.5 g/dL    Hematocrit 36.9 (L) 41 - 53 %    MCV 82.1 80 - 100 fL    MCH 26.8 26 - 34 pg    MCHC 32.6 31 - 37 g/dL    RDW 16.8 (H) 11.5 - 14.5 %    Platelets 072 981 - 685 k/uL    MPV 7.7 6.0 - 12.0 fL    Differential Type NOT REPORTED     Immature Granulocytes NOT REPORTED 0 % Absolute Immature Granulocyte NOT REPORTED 0.00 - 0.30 k/uL    WBC Morphology NOT REPORTED     RBC Morphology NOT REPORTED     Platelet Estimate NOT REPORTED     Seg Neutrophils 83 %    Lymphocytes 4 %    Monocytes 11 %    Eosinophils % 0 %    Basophils 0 %    Myelocytes 2 (H) 0 %    Segs Absolute 15.68 (H) 1.8 - 7.7 k/uL    Absolute Lymph # 0.76 (L) 1.0 - 4.8 k/uL    Absolute Mono # 2.08 (H) 0.2 - 0.8 k/uL    Absolute Eos # 0.00 0.0 - 0.4 k/uL    Basophils # 0.00 0.0 - 0.2 k/uL    Myelocytes Absolute 0.38 (H) 0 k/uL    Morphology ANISOCYTOSIS PRESENT    Lipase   Result Value Ref Range    Lipase 55 13 - 60 U/L   Osmolality   Result Value Ref Range    Serum Osmolality 272 (L) 282 - 298 mOsm/kg   CK MB   Result Value Ref Range    CK-MB <1.0 <10.5 ng/mL   TROP/MYOGLOBIN   Result Value Ref Range    Troponin T <0.03 <0.03 ng/mL    Troponin Interp          Myoglobin 23 (L) 28 - 72 ng/mL   Urinalysis   Result Value Ref Range    Color, UA YELLOW YEL    Turbidity UA CLEAR CLEAR    Glucose, Ur NEGATIVE NEG    Bilirubin Urine NEGATIVE NEG    Ketones, Urine NEGATIVE NEG    Specific Gravity, UA 1.010 1.005 - 1.030    Urine Hgb NEGATIVE NEG    pH, UA 7.0 5.0 - 8.0    Protein, UA 1+ (A) NEG    Urobilinogen, Urine ELEVATED (A) NORM    Nitrite, Urine NEGATIVE NEG    Leukocyte Esterase, Urine NEGATIVE NEG    Urinalysis Comments NOT REPORTED    Lactic Acid, Plasma   Result Value Ref Range    Lactic Acid 1.2 0.5 - 2.2 mmol/L    Lactic Acid, Whole Blood NOT REPORTED 0.7 - 2.1 mmol/L   Microscopic Urinalysis   Result Value Ref Range    -          WBC, UA 0 TO 2 0 - 5 /HPF    RBC, UA 0 TO 2 0 - 2 /HPF    Casts UA NOT REPORTED /LPF    Crystals UA NOT REPORTED NONE /HPF    Epithelial Cells UA 2 TO 5 /HPF    Renal Epithelial, Urine NOT REPORTED 0 /HPF    Bacteria, UA NOT REPORTED NONE    Mucus, UA NOT REPORTED NONE    Trichomonas, UA NOT REPORTED NONE    Amorphous, UA NOT REPORTED NONE    Other Observations UA NOT REPORTED NREQ    Yeast, UA 6.0 5.0 - 8.0    Protein, UA NEGATIVE NEG    Urobilinogen, Urine Normal NORM    Nitrite, Urine NEGATIVE NEG    Leukocyte Esterase, Urine NEGATIVE NEG    Urinalysis Comments NOT REPORTED    TSH w/reflex to FT4   Result Value Ref Range    TSH 1.33 0.30 - 5.00 mIU/L   CORTISOL   Result Value Ref Range    Cortisol 20.3 (H) 2.7 - 18.4 ug/dL    Cortisol Collection Info AFTERNOON    Microscopic Urinalysis   Result Value Ref Range    -          WBC, UA 0 TO 2 0 - 5 /HPF    RBC, UA 2 TO 5 0 - 2 /HPF    Casts UA NOT REPORTED /LPF    Crystals UA NOT REPORTED NONE /HPF    Epithelial Cells UA 2 TO 5 /HPF    Renal Epithelial, Urine NOT REPORTED 0 /HPF    Bacteria, UA NOT REPORTED NONE    Mucus, UA NOT REPORTED NONE    Trichomonas, UA NOT REPORTED NONE    Amorphous, UA NOT REPORTED NONE    Other Observations UA NOT REPORTED NREQ    Yeast, UA NOT REPORTED NONE   Basic Metabolic Panel   Result Value Ref Range    Glucose 89 70 - 99 mg/dL    BUN 11 8 - 23 mg/dL    CREATININE 0.62 (L) 0.70 - 1.20 mg/dL    Bun/Cre Ratio 18 9 - 20    Calcium 8.6 8.6 - 10.4 mg/dL    Sodium 135 135 - 144 mmol/L    Potassium 4.0 3.7 - 5.3 mmol/L    Chloride 97 (L) 98 - 107 mmol/L    CO2 28 20 - 31 mmol/L    Anion Gap 10 9 - 17 mmol/L    GFR Non-African American >60 >60 mL/min    GFR African American >60 >60 mL/min    GFR Comment          GFR Staging NOT REPORTED    CBC Auto Differential   Result Value Ref Range    WBC 7.9 3.5 - 11.0 k/uL    RBC 4.22 (L) 4.5 - 5.9 m/uL    Hemoglobin 11.3 (L) 13.5 - 17.5 g/dL    Hematocrit 35.0 (L) 41 - 53 %    MCV 83.1 80 - 100 fL    MCH 26.8 26 - 34 pg    MCHC 32.3 31 - 37 g/dL    RDW 16.5 (H) 11.5 - 14.5 %    Platelets 741 164 - 306 k/uL    MPV 7.6 6.0 - 12.0 fL    Differential Type NOT REPORTED     Immature Granulocytes NOT REPORTED 0 %    Absolute Immature Granulocyte NOT REPORTED 0.00 - 0.30 k/uL    WBC Morphology NOT REPORTED     RBC Morphology NOT REPORTED     Platelet Estimate NOT REPORTED     Seg Neutrophils 78 %

## 2017-11-13 NOTE — PROGRESS NOTES
Follow-up malnutrition  Eating better taken his ensure no nausea no vomiting no abdominal pain or change in bowel habits review of system  No fever no chills no GI/ complaints no carotid complaints. No bleeding, no polydipsia no polyuria, hypoglycemia no headaches sore throat mask and decubiti clinic thank you physical exam vitals stable  Eyes mild pallor no jaundice  Neck supple no JVD no bruit  Lungs clear to auscultation percussion heart regular rate and rhythm no gallop no murmur  Abdomen soft as well as sounds without any tenderness  Extremities good pulses without any edema    Alert oriented ×3 with no focal deficit  Assessment and plan  Severe malnutrition continue protein supplements  Hyponatremia resolved  Hypokalemia resolved leukocytosis resolved.   Traumatic ulcer of left second toe with a subacute osteomyelitis  Open wound chest  Stage IV: Cancer colon minutes labs reviewed home today follow-up with oncology at PCP in 1 week ago visiting nurse with home PT OT help with a walker

## 2017-11-13 NOTE — PROGRESS NOTES
thrive syndrome, adult    Draining postoperative wound    Subacute osteomyelitis of left foot ([facility]       Plan: Agree with discharge planning, repeat serum PSA is outpatient, monitor voiding symptoms and postvoid    Alverta Kapil [facility]  6:47 AM 11/13/2017

## 2017-11-13 NOTE — PLAN OF CARE
Problem: OXYGENATION/RESPIRATORY FUNCTION  Goal: Patient will maintain patent airway  Conserving of energy cont's. Education for control of symptoms of CHF reinforced. Diet and fluids monitored and tolerating well.

## 2017-11-13 NOTE — PROGRESS NOTES
Physical Therapy    Facility/Department: [facility]  Initial Assessment    NAME: Kathryn Schmitz  : 1934  MRN: [id number]      Discharge Recommendations: Home with assist & [facility] PT    Date of Service: 2017  Pt presented to ED on 11/10/17 after his son found him unresponsive at home. Patient has a history of colon cancer. He denies any pain currently is fairly unresponsive at 1st and cachectic but not confused He has a open wound on his chest from a squamous cell carcinoma procedure that has been draining. He also has one on his 2nd toe. RN reports patient is medically stable for therapy treatment this date. Chart reviewed prior to treatment and patient is agreeable for therapy. Patient Diagnosis(es): The primary encounter diagnosis was Bacteremia. Diagnoses of General weakness and Cellulitis and abscess of trunk were also pertinent to this visit. has a past medical history of Colon cancer ([facility] Congestive heart failure (CHF) ([facility] COPD (chronic obstructive pulmonary disease) ([facility] History of atrial fibrillation; Hyperlipidemia; Hypertension; Obstructive sleep apnea; and Prostate cancer ([facility] has a past surgical history that includes sigmoidectomy () and Cystoscopy (11-20-15).     Restrictions  Restrictions/Precautions  Restrictions/Precautions: Fall Risk, General Precautions  Position Activity Restriction  Other position/activity restrictions: up with assist  Vision/Hearing  Vision: Impaired  Vision Exceptions: Wears glasses at all times  Hearing: Exceptions to [facility]  Hearing Exceptions: Hard of hearing/hearing concerns     Subjective  General  Chart Reviewed: Yes  Patient assessed for rehabilitation services?: Yes  Additional Pertinent Hx: sore toe on Right foot(4th toe)  Follows Commands: Within Functional Limits  General Comment  Comments: RNAngela PT  Subjective  Subjective: Pt agreeable to PT  Pain Screening  Patient Currently in Pain: Denies  Vital Signs  Patient Currently in Pain: Denies       Orientation  Orientation  Overall Orientation Status: Within Functional Limits    Social/Functional History  Social/Functional History  Lives With: Son  Type of Home: House  Home Layout: Multi-level  Home Access: Stairs to enter with rails  Entrance Stairs - Number of Steps: 4  Entrance Stairs - Rails: Right  Bathroom Shower/Tub: Tub/Shower unit  Bathroom Toilet: Standard  Bathroom Equipment: Grab bars in shower & around toilet, Shower chair  ADL Assistance: Needs assistance (Son assists bathing, states he can dress himself)  Homemaking Assistance: Needs assistance  Homemaking Responsibilities: No  Ambulation Assistance: Independent  Transfer Assistance: Independent  Active : No  Patient's  Info: son  Mode of Transportation: Car  Occupation: Retired  Type of occupation:   Objective          AROM RLE (degrees)  RLE AROM: WFL  AROM LLE (degrees)  LLE AROM : WFL  AROM RUE (degrees)  RUE AROM : WFL  AROM LUE (degrees)  LUE AROM : WFL  Strength RLE  Comment: 4/5 hip  Strength LLE  Comment: 4/5 hip  Strength RUE  Comment: 4/5 shoulder  Strength LUE  Comment: 4/5 shoulder  Coordination  Movements Are Fluid And Coordinated: Yes  Motor Control  Gross Motor?: WFL  Sensation  Overall Sensation Status: WFL  Bed mobility  Rolling to Left: Stand by assistance  Rolling to Right: Stand by assistance  Supine to Sit: Contact guard assistance  Sit to Supine: Contact guard assistance  Scooting: Contact guard assistance   Pt sat at EOB for 5 minutes to rest after bed mobility & prepare lines for standing & ambulation.     Transfers  Sit to Stand: Contact guard assistance  Stand to sit: Contact guard assistance  Bed to Chair: Contact guard assistance  Stand Pivot Transfers: Minimal Assistance  Lateral Transfers: Contact guard assistance  Ambulation  Ambulation?: Yes  Ambulation 1  Surface: level tile  Device: No Device  Assistance: Contact guard assistance  Quality of Gait: balance to decrease risk of falls & better activity tolerance. Prognosis: Good  Decision Making: High Complexity  Exam:  ROM, MMT, functional mobility, activity tolerance, Balance, [place] & THE [facility] Physical Therapy Acute Care Functional Outcomes      Clinical Presentation: evolving  Patient Education: PT POC, functional mobility  Barriers to Learning: none  REQUIRES PT FOLLOW UP: Yes  Activity Tolerance  Activity Tolerance: Patient limited by fatigue;Patient limited by endurance     Discharge Recommendations:  Home with Home health PT, Home with assist PRN      Plan   Plan  Times per week: 1-2x/D, 5-6D/week  Current Treatment Recommendations: Strengthening, Balance Training, Functional Mobility Training, Endurance Training, Gait Training, Stair training, Home Exercise Program, Safety Education & Training, Patient/Caregiver Education & Training  Safety Devices  Type of devices: Bed alarm in place, Call light within reach, Left in bed, Nurse notified    G-Code    PT G-codes    Functional Assessment Tool Used: THE [facility] Physical Therapy [facility] Functional Outcomes    Score = 14    Functional Limitation: Mobility & Walking Around    Mobility & Walking Around Current VWRMXU():     CK = At least 40% but less than 60% impaired, limited or restricted    Mobility & Moving Around Goal APQBPA():    CI: At least 1% but less than 20% impaired, limited or restricted          Goals  Short term goals  Time Frame for Short term goals: 12 visits  Short term goal 1: Inc bed-mobility & transfers to independent; Short term goal 2: Inc gait to amb 200ft or > indep w/ safest device; Short term goal 3: Pt able to tolerate 30 min of activity to include 15-20 reps of ex & functional mobility to facilitate better activity tolerance; Short term goal 4: Inc strength to [facility] to facilitate pt independence with functional mobility;   Short term goal 5: Improve standing balance to good with AD to reduce fall risk;  Patient Goals   Patient goals : return home with my son       Therapy Time   Individual Concurrent Group Co-treatment   Time In  [address] [place] [address]         Time Out  0814         Minutes  Pritesh Devries, TWILA

## 2017-11-14 NOTE — PROGRESS NOTES
[facility]  Progress Note    Roxann Loera  MRN: [id number]  Today's Date and Time: 2017, 7:55 PM    Impression :   · Skin cancer on the anterior chest status post resection and has a open ulceration not currently infected. · Left 2nd toe wound secondary to trauma with osteomyelitis given the finding of exposed bone in the wound bed. .  · Leukocytosis resolved. · Stage IV colon cancer  · Left eye extropion with no acute infection  · History of prostate cancer  · Severe protein calorie malnutrition/debility/failure to thrive. · COPD/obstructive sleep apnea    Recommendations:   · Continue off antimicrobial therapy. · Continue local wound care for the multiple superficial ulcers. · Infectious disease wise the patient is stable for discharge  · Please call if I can be of any further assistance. Summary of Stay:   Roxann Loera is a 80y.o.-year-old male who was initially admitted on 11/10/2017. Marco Antonio Mock lives at home with his son and does have a history of colon cancer. The patient was found unresponsive by the son and in general has been fatigued and cachectic. 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 The patient himself does not really have any major complaints he does not report any fevers or chills. He does not have any cough or significant shortness of breath. No abdominal pain nausea vomiting no diarrhea or constipation. Current evaluation:2017    BP (!) 160/63   Pulse 72   Temp 98 °F (36.7 °C) (Oral)   Resp 18   Ht 5' 11\" (1.803 m)   Wt 130 lb (59 kg)   SpO2 98%   BMI 18.13 kg/m²     Temperature Range: Temp: 98 °F (36.7 °C) Temp  Av °F (36.7 °C)  Min: 98 °F (36.7 °C)  Max: 98 °F (36.7 °C)    The patient is seen and evaluated at bedside. He is awake and alert in no acute distress. The left eye is covered.   He does have family at bedside caring for him. He does not report any fevers or chills. Physical Examination :     Physical Exam   Constitutional: He appears cachectic. He has a sickly appearance. HENT:   Head: Normocephalic. Eyes:   The patient has a patch over the left eye   Neck: Normal range of motion. Neck supple. Cardiovascular: Normal rate, regular rhythm and normal heart sounds. There is a dressing over the anterior chest ulceration. Pulmonary/Chest: Effort normal and breath sounds normal.   Abdominal: Soft. Bowel sounds are normal.   Musculoskeletal:   The dressing over the left foot 2nd toe was not removed   Neurological: He is alert. Skin: Skin is warm and dry. Laboratory data:   I have independently reviewed the following labs:  CBC with Differential:   Recent Labs      11/11/17   1446  11/13/17   0735   WBC  14.2*  7.9   HGB  11.2*  11.3*   HCT  34.8*  35.0*   PLT  217  239   LYMPHOPCT  7  10   MONOPCT  9  11     BMP:   Recent Labs      11/11/17   1446  11/13/17   0735   NA  131*  135   K  3.6*  4.0   CL  92*  97*   CO2  23  28   BUN  16  11   CREATININE  0.77  0.62*     Hepatic Function Panel:   Recent Labs      11/11/17   1446   PROT  6.5   LABALBU  2.5*   BILIDIR  0.53*   IBILI  0.53   BILITOT  1.06   ALKPHOS  126   ALT  28   AST  36     No results for input(s): [facility] in the last 72 hours. No results found for: CRP  No results found for: SEDRATE      Imaging Studies:   No new imaging    Cultures:     Culture Blood #1 [[id number]] Collected: 11/10/17 1028   Order Status: Completed Specimen: Blood Updated: 11/13/17 0704    Specimen Description . BLOOD 6ML RT AC Performed at [address]    Specimen Description [place], [address] [facility] [phone]    Special Requests NOT REPORTED    Culture NO GROWTH 3 DAYS    Culture Performed at [facility] [address] [facility], [address] [place] [address] [phone]    Status Pending   Culture Blood #1 [[id number]] Collected: 11/10/17 1040   Order Status: Completed Specimen: Blood Updated: 11/13/17 0704    Specimen Description . BLOOD 3ML RT HAND Performed at [address] [place] [address]    Specimen Description [address] [phone]    Special Requests NOT REPORTED    Culture NO GROWTH 3 DAYS    Culture Performed at [address] [phone]    Status Pending   Urine Culture [[id number]] Collected: 11/10/17 1159   Order Status: Completed Specimen: Urine, clean catch Updated: 11/11/17 1639    Specimen Description . CLEAN CATCH URINE Performed at [address] [place] [address]    Specimen Description [place] [address] [place] [address] [phone]    Special Requests NOT REPORTED    Culture NO GROWTH    Culture Performed at [address] [phone]    Status FINAL 11/11/2017       Medications:      enoxaparin  30 mg Subcutaneous Daily    oxyCODONE  15 mg Oral Daily    sodium chloride flush  10 mL Intravenous 2 times per day    amiodarone  200 mg Oral Daily    aspirin  81 mg Oral Daily    furosemide  40 mg Oral Daily    metoprolol tartrate  25 mg Oral Daily    tamsulosin  0.4 mg Oral Daily     Thank you for allowing us to participate in the care of this patient. Please call with questions.     Azam Ac MD  [employer] messaging: [phone]

## 2017-11-16 LAB
CULTURE: NORMAL
Lab: NORMAL
Lab: NORMAL
SPECIMEN DESCRIPTION: NORMAL
STATUS: NORMAL
STATUS: NORMAL

## 2017-11-18 NOTE — DISCHARGE SUMMARY
daily, Disp-30 tablet, R-0Print      senna (SENOKOT) 8.6 MG tablet Take 1 tablet by mouth 2 times daily, Disp-60 tablet, R-0Print      metoprolol tartrate (LOPRESSOR) 25 MG tablet Take 25 mg by mouth dailyHistorical Med      oxyCODONE (OXY-IR) 15 MG immediate release tablet Take 15-30 mg by mouth every 3 hours as needed for Pain  . Historical Med      furosemide (LASIX) 40 MG tablet Take 1 tablet by mouth daily, Disp-30 tablet, R-0Normal      tamsulosin (FLOMAX) 0.4 MG capsule Take 0.4 mg by mouth daily      aspirin 81 MG tablet Take 81 mg by mouth daily           Activity: activity as tolerated  Diet: No added salt 2000 mL fluid restriction    Follow-up with PCP in 1 week. Oncology in 2 weeks patient refused ecfSigned:   Behzad Buchanan MD  11/18/2017  3:18 PM

## 2017-11-21 ENCOUNTER — TELEPHONE (OUTPATIENT)
Dept: ONCOLOGY | Age: 82
End: 2017-11-21
